# Patient Record
Sex: MALE | Race: WHITE | NOT HISPANIC OR LATINO | Employment: OTHER | URBAN - METROPOLITAN AREA
[De-identification: names, ages, dates, MRNs, and addresses within clinical notes are randomized per-mention and may not be internally consistent; named-entity substitution may affect disease eponyms.]

---

## 2017-11-27 ENCOUNTER — OFFICE VISIT (OUTPATIENT)
Dept: FAMILY MEDICINE | Facility: CLINIC | Age: 47
End: 2017-11-27
Payer: COMMERCIAL

## 2017-11-27 VITALS
TEMPERATURE: 98.9 F | WEIGHT: 295.3 LBS | HEART RATE: 74 BPM | RESPIRATION RATE: 16 BRPM | BODY MASS INDEX: 41.34 KG/M2 | DIASTOLIC BLOOD PRESSURE: 86 MMHG | SYSTOLIC BLOOD PRESSURE: 126 MMHG | OXYGEN SATURATION: 96 % | HEIGHT: 71 IN

## 2017-11-27 DIAGNOSIS — E66.01 MORBID OBESITY WITH BMI OF 45.0-49.9, ADULT (H): ICD-10-CM

## 2017-11-27 DIAGNOSIS — Z13.6 CARDIOVASCULAR SCREENING; LDL GOAL LESS THAN 130: ICD-10-CM

## 2017-11-27 DIAGNOSIS — Z00.00 ROUTINE GENERAL MEDICAL EXAMINATION AT A HEALTH CARE FACILITY: Primary | ICD-10-CM

## 2017-11-27 DIAGNOSIS — R73.09 ELEVATED GLUCOSE: ICD-10-CM

## 2017-11-27 LAB
CHOLEST SERPL-MCNC: 201 MG/DL
GLUCOSE SERPL-MCNC: 182 MG/DL (ref 70–99)
HBA1C MFR BLD: 6.3 % (ref 4.3–6)
HDLC SERPL-MCNC: 32 MG/DL
LDLC SERPL CALC-MCNC: 121 MG/DL
NONHDLC SERPL-MCNC: 169 MG/DL
PSA SERPL-ACNC: 0.86 UG/L (ref 0–4)
TRIGL SERPL-MCNC: 242 MG/DL

## 2017-11-27 PROCEDURE — 99386 PREV VISIT NEW AGE 40-64: CPT | Performed by: FAMILY MEDICINE

## 2017-11-27 PROCEDURE — 82947 ASSAY GLUCOSE BLOOD QUANT: CPT | Performed by: FAMILY MEDICINE

## 2017-11-27 PROCEDURE — G0103 PSA SCREENING: HCPCS | Performed by: FAMILY MEDICINE

## 2017-11-27 PROCEDURE — 36415 COLL VENOUS BLD VENIPUNCTURE: CPT | Performed by: FAMILY MEDICINE

## 2017-11-27 PROCEDURE — 80061 LIPID PANEL: CPT | Performed by: FAMILY MEDICINE

## 2017-11-27 PROCEDURE — 83036 HEMOGLOBIN GLYCOSYLATED A1C: CPT | Performed by: FAMILY MEDICINE

## 2017-11-27 RX ORDER — ALBUTEROL SULFATE 90 UG/1
2 AEROSOL, METERED RESPIRATORY (INHALATION) PRN
COMMUNITY
End: 2019-12-03

## 2017-11-27 ASSESSMENT — PAIN SCALES - GENERAL: PAINLEVEL: NO PAIN (0)

## 2017-11-27 NOTE — MR AVS SNAPSHOT
After Visit Summary   11/27/2017    Abraham Thibodeaux    MRN: [id number]           Patient Information     Date Of Birth          1970        Visit Information        Provider Department      11/27/2017 10:00 AM Dyan Alston MD [facility]        Today's Diagnoses     Routine general medical examination at a health care facility    -  1    Morbid obesity with BMI of 45.0-49.9, adult (H)        CARDIOVASCULAR SCREENING; LDL GOAL LESS THAN 130        Elevated glucose          Care Instructions      Preventive Health Recommendations  Male Ages 40 to 49    Yearly exam:             See your health care provider every year in order to  o   Review health changes.   o   Discuss preventive care.    o   Review your medicines if your doctor has prescribed any.    You should be tested each year for STDs (sexually transmitted diseases) if you re at risk.     Have a cholesterol test every 5 years.     Have a colonoscopy (test for colon cancer) if someone in your family has had colon cancer or polyps before age 50.     After age 45, have a diabetes test (fasting glucose). If you are at risk for diabetes, you should have this test every 3 years.      Talk with your health care provider about whether or not a prostate cancer screening test (PSA) is right for you.    Shots: Get a flu shot each year. Get a tetanus shot every 10 years.     Nutrition:    Eat at least 5 servings of fruits and vegetables daily.     Eat whole-grain bread, whole-wheat pasta and brown rice instead of white grains and rice.     Talk to your provider about Calcium and Vitamin D.     Lifestyle    Exercise for at least 150 minutes a week (30 minutes a day, 5 days a week). This will help you control your weight and prevent disease.     Limit alcohol to one drink per day.     No smoking.     Wear sunscreen to prevent skin cancer.     See your dentist every six months for an exam and cleaning.              Follow-ups  "after your visit        Follow-up notes from your care team     Return in about 1 year (around 2018).      Who to contact     If you have questions or need follow up information about today's clinic visit or your schedule please contact [facility] directly at [phone].  Normal or non-critical lab and imaging results will be communicated to you by MyChart, letter or phone within 4 business days after the clinic has received the results. If you do not hear from us within 7 days, please contact the clinic through MyChart or phone. If you have a critical or abnormal lab result, we will notify you by phone as soon as possible.  Submit refill requests through [employer] or call your pharmacy and they will forward the refill request to us. Please allow 3 business days for your refill to be completed.          Additional Information About Your Visit        MyChart Information     [employer] lets you send messages to your doctor, view your test results, renew your prescriptions, schedule appointments and more. To sign up, go to [email or web address] . Click on \"Log in\" on the left side of the screen, which will take you to the Welcome page. Then click on \"Sign up Now\" on the right side of the page.     You will be asked to enter the access code listed below, as well as some personal information. Please follow the directions to create your username and password.     Your access code is: [email or web address]  Expires: 2018 10:39 AM     Your access code will  in 90 days. If you need help or a new code, please call your [facility] or [phone].        Care EveryWhere ID     This is your Care EveryWhere ID. This could be used by other organizations to access your [facility] medical records  [id number]        Your Vitals Were     Pulse Temperature Respirations Height Pulse Oximetry BMI (Body Mass Index)    74 98.9  F (37.2  C) (Oral) 16 1.791 m (5' 10.5\") 96% 41.77 kg/m2       Blood Pressure from " Last 3 Encounters:   11/27/17 126/86   11/02/12 122/81    Weight from Last 3 Encounters:   11/27/17 133.9 kg (295 lb 4.8 oz)   11/02/12 117.7 kg (259 lb 8 oz)              We Performed the Following     GLUCOSE     Hemoglobin A1c     Lipid panel reflex to direct LDL Fasting     Prostate spec antigen screen        Primary Care Provider Office Phone # Fax #    Dyan Tristan Alston [phone] [address] [facility] [address]        Equal Access to Services     [facility]: Hadii aad ku hadasho Soomaali, waaxda luqadaha, qaybta kaalmada adeegyada, waxay idiin hayaan mor trent . So [facility] [phone].    ATENCIÓN: Si habla español, tiene a roellana disposición servicios gratuitos de asistencia lingüística. [facility] [phone].    We comply with applicable federal civil rights laws and [place] laws. We do not discriminate on the basis of race, color, national origin, age, disability, sex, sexual orientation, or gender identity.            Thank you!     Thank you for choosing [facility]  for your care. Our goal is always to provide you with excellent care. Hearing back from our patients is one way we can continue to improve our services. Please take a few minutes to complete the written survey that you may receive in the mail after your visit with us. Thank you!             Your Updated Medication List - Protect others around you: Learn how to safely use, store and throw away your medicines at www.disposemymeds.org.          This list is accurate as of: 11/27/17 10:39 AM.  Always use your most recent med list.                   Brand Name Dispense Instructions for use Diagnosis    albuterol 108 (90 BASE) MCG/ACT Inhaler    PROAIR HFA/PROVENTIL HFA/VENTOLIN HFA     Inhale 2 puffs into the lungs as needed for shortness of breath / dyspnea or wheezing        FLONASE 50 MCG/ACT spray   Generic drug:  fluticasone      Spray 2 sprays into both nostrils daily.         SYMBICORT 160-4.5 MCG/ACT Inhaler   Generic drug:  budesonide-formoterol      Inhale 2 puffs into the lungs 2 times daily.

## 2017-11-27 NOTE — NURSING NOTE
"Chief Complaint   Patient presents with     Physical     pt is fasting     ER F/U     [facility] -        Initial /86 (BP Location: Right arm, Patient Position: Right side, Cuff Size: Adult Regular)  Pulse 74  Temp 98.9  F (37.2  C) (Oral)  Resp 16  Ht 1.791 m (5' 10.5\")  Wt 133.9 kg (295 lb 4.8 oz)  SpO2 96%  BMI 41.77 kg/m2 Estimated body mass index is 41.77 kg/(m^2) as calculated from the following:    Height as of this encounter: 1.791 m (5' 10.5\").    Weight as of this encounter: 133.9 kg (295 lb 4.8 oz).  Medication Reconciliation: complete     Will Arturo OVIEDO      "

## 2017-11-27 NOTE — PROGRESS NOTES
SUBJECTIVE:   CC: Abraham Thibodeaux is an 47 year old male who presents for preventative health visit.     Healthy Habits:    Do you get at least three servings of calcium containing foods daily (dairy, green leafy vegetables, etc.)? yes    Amount of exercise or daily activities, outside of work: none    Problems taking medications regularly No    Medication side effects: No    Have you had an eye exam in the past two years? yes    Do you see a dentist twice per year? yes  Do you have sleep apnea, excessive snoring or daytime drowsiness?yes - uses CPAP    1. ER follow up - 11/2/17 - chest pain   - [facility]    - stress, anxiety related?      Today's PHQ-2 Score:   PHQ-2 ( 1999 Pfizer) 11/27/2017   Q1: Little interest or pleasure in doing things 0   Q2: Feeling down, depressed or hopeless 0   PHQ-2 Score 0         Abuse: Current or Past(Physical, Sexual or Emotional)- No  Do you feel safe in your environment - Yes  Social History   Substance Use Topics     Smoking status: Never Smoker     Smokeless tobacco: Never Used     Alcohol use No     The patient does not drink >3 drinks per day nor >7 drinks per week.    Last PSA: No results found for: PSA    Reviewed orders with patient. Reviewed health maintenance and updated orders accordingly - Yes  Labs reviewed in EPIC  BP Readings from Last 3 Encounters:   11/27/17 126/86   11/02/12 122/81    Wt Readings from Last 3 Encounters:   11/27/17 133.9 kg (295 lb 4.8 oz)   11/02/12 117.7 kg (259 lb 8 oz)                      Reviewed and updated as needed this visit by clinical staffTobacco  Allergies  Meds  Med Hx  Surg Hx  Fam Hx  Soc Hx        Reviewed and updated as needed this visit by Provider        Here today for routine checkup and to establish care. In general the patient is in pretty good health overall. Was recently seen through [place] emergency department after bout with chest pain - full records reviewed with patient and her care everywhere.  "Had a positive d-dimer at urgent care and was sent to the ER with CT scan was negative. He had a nuclear medicine stress test performed and this was normal. Patient is gained 50 pounds in last couple years since having a child and wife having cancer. Knows he needs to improve his overall health. Review of outside records shows a low HDL but other numbers good and his cholesterol profile. His had some mildly elevated glucose but less than 120. Biggest family risk is cancer      ROS:  C: NEGATIVE for fever, chills, change in weight  I: NEGATIVE for worrisome rashes, moles or lesions  E: NEGATIVE for vision changes or irritation  ENT: NEGATIVE for ear, mouth and throat problems  R: NEGATIVE for significant cough or SOB  CV: NEGATIVE for chest pain, palpitations or peripheral edema  GI: NEGATIVE for nausea, abdominal pain, heartburn, or change in bowel habits   male: negative for dysuria, hematuria, decreased urinary stream, erectile dysfunction, urethral discharge  M: NEGATIVE for significant arthralgias or myalgia  N: NEGATIVE for weakness, dizziness or paresthesias  P: NEGATIVE for changes in mood or affect    OBJECTIVE:   /86 (BP Location: Right arm, Patient Position: Right side, Cuff Size: Adult Regular)  Pulse 74  Temp 98.9  F (37.2  C) (Oral)  Resp 16  Ht 1.791 m (5' 10.5\")  Wt 133.9 kg (295 lb 4.8 oz)  SpO2 96%  BMI 41.77 kg/m2  EXAM:  GENERAL: healthy, alert and no distress  EYES: Eyes grossly normal to inspection, PERRL and conjunctivae and sclerae normal  HENT: ear canals and TM's normal, nose and mouth without ulcers or lesions  NECK: no adenopathy, no asymmetry, masses, or scars and thyroid normal to palpation  RESP: lungs clear to auscultation - no rales, rhonchi or wheezes  CV: regular rate and rhythm, normal S1 S2, no S3 or S4, no murmur, click or rub, no peripheral edema and peripheral pulses strong  ABDOMEN: soft, nontender, no hepatosplenomegaly, no masses and bowel sounds normal  MS: " "no gross musculoskeletal defects noted, no edema  SKIN: no suspicious lesions or rashes  NEURO: Normal strength and tone, mentation intact and speech normal  PSYCH: mentation appears normal, affect normal/bright    ASSESSMENT/PLAN:   1. Routine general medical examination at a health care facility  Discussed routine health maintenance at his current age and going forward. At age 48 will start a more detailed look at colon Screening.    Think a baseline for prostate as well as overall vascular risks. Long discussion regarding diet and exercise for weight loss  - Lipid panel reflex to direct LDL Fasting  - GLUCOSE  - Hemoglobin A1c  - Prostate spec antigen screen    2. Morbid obesity with BMI of 45.0-49.9, adult (H)  As above     3. CARDIOVASCULAR SCREENING; LDL GOAL LESS THAN 130      4. Elevated glucose    - Hemoglobin A1c    COUNSELING:  Reviewed preventive health counseling, as reflected in patient instructions       Regular exercise       Healthy diet/nutrition       Vision screening       Colon cancer screening       Prostate cancer screening           reports that he has never smoked. He has never used smokeless tobacco.      Estimated body mass index is 41.77 kg/(m^2) as calculated from the following:    Height as of this encounter: 1.791 m (5' 10.5\").    Weight as of this encounter: 133.9 kg (295 lb 4.8 oz).   Weight management plan: Discussed healthy diet and exercise guidelines and patient will follow up in 12 months in clinic to re-evaluate.    Counseling Resources:  ATP IV Guidelines  Pooled Cohorts Equation Calculator  FRAX Risk Assessment  ICSI Preventive Guidelines  Dietary Guidelines for Americans, 2010  USDA's MyPlate  ASA Prophylaxis  Lung CA Screening    Dyan Alston MD  [facility]  "

## 2017-11-27 NOTE — LETTER
[address]  [address]  [phone]    December 5, 2017      Abraham Thibodeaux  [address] [place] [address]      Dear Abraham,    Your cholesterol and sugar are mildly elevated (borderline diabetes) - not yet to the point of needing any medication, but I do think we should try to bring them down through diet, exercise, weight loss, etc.  I suggest a diet high in protein and plants (fruits, veggies) and low in simple carbohydrates, combined with an exercise program including both cardio and weight training.  We can recheck on this annually.    MOE Alston MD

## 2017-12-05 NOTE — PROGRESS NOTES
Please mail results and note to patient:    Abraham,  Your cholesterol and sugar are mildly elevated (borderline diabetes) - not yet to the point of needing any medication, but I do think we should try to bring them down through diet, exercise, weight loss, etc.  I suggest a diet high in protein and plants (fruits, veggies) and low in simple carbohydrates, combined with an exercise program including both cardio and weight training.  We can recheck on this annually.    MOE Alston MD

## 2017-12-13 DIAGNOSIS — B35.3 TINEA PEDIS OF BOTH FEET: ICD-10-CM

## 2017-12-13 DIAGNOSIS — B37.2 CANDIDAL INTERTRIGO: ICD-10-CM

## 2017-12-13 NOTE — TELEPHONE ENCOUNTER
Refill request received for ketoconazole 2% cream.  Forwarding to MD for courtesy refill.  No follow up appt scheduled.  Per 12/2016 appt with Dr. Vogel:  Impression/Plan:  1.                   Candida Intertrigo in abdominal skin fold: continue ketoconazole 2% shampoo and ketoconazole 2% cream prn. Use of an OTC drying powder can be helpful.  2.                   Tinea Pedis and Onychomycosis: Pt also has a lot of powdery thick scale on his feet.    Ketoconazole cream 2% BID and Ketoconazole 2% shampoo as body wash when showering.    Urea 40% Cream, apply after fungal cream    Not treating onychomycosis currently. Can continue using OTC nail treatment if it doesn't cause any rashes on the skin around the nail.     Follow-up in 6 months for skin check    Susanne Matos RN

## 2017-12-14 RX ORDER — KETOCONAZOLE 20 MG/G
CREAM TOPICAL
Qty: 60 G | Refills: 1 | Status: SHIPPED | OUTPATIENT
Start: 2017-12-14 | End: 2018-01-26

## 2018-01-15 ENCOUNTER — OFFICE VISIT (OUTPATIENT)
Dept: FAMILY MEDICINE | Facility: CLINIC | Age: 48
End: 2018-01-15
Payer: COMMERCIAL

## 2018-01-15 VITALS
SYSTOLIC BLOOD PRESSURE: 116 MMHG | DIASTOLIC BLOOD PRESSURE: 76 MMHG | OXYGEN SATURATION: 95 % | WEIGHT: 291 LBS | TEMPERATURE: 98.4 F | BODY MASS INDEX: 41.66 KG/M2 | HEART RATE: 82 BPM | RESPIRATION RATE: 18 BRPM | HEIGHT: 70 IN

## 2018-01-15 DIAGNOSIS — J20.9 ACUTE BRONCHITIS, UNSPECIFIED ORGANISM: Primary | ICD-10-CM

## 2018-01-15 DIAGNOSIS — J45.30 MILD PERSISTENT ASTHMA WITHOUT COMPLICATION: ICD-10-CM

## 2018-01-15 PROCEDURE — 99213 OFFICE O/P EST LOW 20 MIN: CPT | Performed by: FAMILY MEDICINE

## 2018-01-15 RX ORDER — AZITHROMYCIN 250 MG/1
TABLET, FILM COATED ORAL
Qty: 6 TABLET | Refills: 0 | Status: SHIPPED | OUTPATIENT
Start: 2018-01-15 | End: 2018-01-18

## 2018-01-15 NOTE — MR AVS SNAPSHOT
"              After Visit Summary   1/15/2018    Abraham Thibodeaux    MRN: [id number]           Patient Information     Date Of Birth          1970        Visit Information        Provider Department      1/15/2018 1:20 PM Dyan Alston MD [facility]        Today's Diagnoses     Acute bronchitis, unspecified organism    -  1    Mild persistent asthma without complication           Follow-ups after your visit        Who to contact     If you have questions or need follow up information about today's clinic visit or your schedule please contact [facility] directly at [phone].  Normal or non-critical lab and imaging results will be communicated to you by [employer], letter or phone within 4 business days after the clinic has received the results. If you do not hear from us within 7 days, please contact the clinic through [employer] or phone. If you have a critical or abnormal lab result, we will notify you by phone as soon as possible.  Submit refill requests through [employer] or call your pharmacy and they will forward the refill request to us. Please allow 3 business days for your refill to be completed.          Additional Information About Your Visit        MyChart Information     [employer] lets you send messages to your doctor, view your test results, renew your prescriptions, schedule appointments and more. To sign up, go to [email or web address] . Click on \"Log in\" on the left side of the screen, which will take you to the Welcome page. Then click on \"Sign up Now\" on the right side of the page.     You will be asked to enter the access code listed below, as well as some personal information. Please follow the directions to create your username and password.     Your access code is: [email or web address]  Expires: 2018 10:39 AM     Your access code will  in 90 days. If you need help or a new code, please call your [facility] or [phone].        Care EveryWhere " "ID     This is your Care EveryWhere ID. This could be used by other organizations to access your [facility] medical records  [id number]        Your Vitals Were     Pulse Temperature Respirations Height Pulse Oximetry BMI (Body Mass Index)    82 98.4  F (36.9  C) (Oral) 18 1.79 m (5' 10.47\") 95% 41.2 kg/m2       Blood Pressure from Last 3 Encounters:   01/15/18 116/76   11/27/17 126/86   11/02/12 122/81    Weight from Last 3 Encounters:   01/15/18 132 kg (291 lb)   11/27/17 133.9 kg (295 lb 4.8 oz)   11/02/12 117.7 kg (259 lb 8 oz)              Today, you had the following     No orders found for display         Today's Medication Changes          These changes are accurate as of: 1/15/18  1:50 PM.  If you have any questions, ask your nurse or doctor.               Start taking these medicines.        Dose/Directions    azithromycin 250 MG tablet   Commonly known as:  ZITHROMAX   Used for:  Acute bronchitis, unspecified organism   Started by:  Dyan Alston MD        Two tabs today.  One tab daily x 4 days.   Quantity:  6 tablet   Refills:  0            Where to get your medicines      These medications were sent to [place] [address] [place] [facility] [address] [facility]  [address] [facility] [address]     Phone:  [phone]     azithromycin 250 MG tablet                Primary Care Provider Office Phone # Fax #    Dyan Alston [phone] [address] [facility] [address]        Equal Access to Services     [facility]: Hadii pedro cuellar hadasho Sochelle, waaxda luqadaha, qaybta kaalmada cassandra, bryan trent . So [facility] [phone].    ATENCIÓN: Si habla español, tiene a orellana disposición servicios gratuitos de asistencia lingüística. Llame al [phone].    We comply with applicable federal civil rights laws and [place] laws. We do not discriminate on the basis of race, color, national origin, age, disability, sex, " sexual orientation, or gender identity.            Thank you!     Thank you for choosing [facility]  for your care. Our goal is always to provide you with excellent care. Hearing back from our patients is one way we can continue to improve our services. Please take a few minutes to complete the written survey that you may receive in the mail after your visit with us. Thank you!             Your Updated Medication List - Protect others around you: Learn how to safely use, store and throw away your medicines at www.disposemymeds.org.          This list is accurate as of: 1/15/18  1:50 PM.  Always use your most recent med list.                   Brand Name Dispense Instructions for use Diagnosis    albuterol 108 (90 BASE) MCG/ACT Inhaler    PROAIR HFA/PROVENTIL HFA/VENTOLIN HFA     Inhale 2 puffs into the lungs as needed for shortness of breath / dyspnea or wheezing        azithromycin 250 MG tablet    ZITHROMAX    6 tablet    Two tabs today.  One tab daily x 4 days.    Acute bronchitis, unspecified organism       FLONASE 50 MCG/ACT spray   Generic drug:  fluticasone      Spray 2 sprays into both nostrils daily.        ketoconazole 2 % cream    NIZORAL    60 g    Apply twice a day to rash on the belly fold, groin, and feet. Wash hands after applying to each location. Due for appt for further refills    Candidal intertrigo, Tinea pedis of both feet       SYMBICORT 160-4.5 MCG/ACT Inhaler   Generic drug:  budesonide-formoterol      Inhale 2 puffs into the lungs 2 times daily.

## 2018-01-15 NOTE — PROGRESS NOTES
"  SUBJECTIVE:   Abraham Thibodeaux is a 47 year old male who presents to clinic today for the following health issues:      Acute Illness   Acute illness concerns: Cough/Chest congestion   Onset: 9 days ago     Fever: no    Chills/Sweats: YES    Headache (location?): YES, from coughing     Sinus Pressure:YES    Conjunctivitis:  no    Ear Pain: no    Rhinorrhea: YES    Congestion: YES    Sore Throat: no      Cough: YES-non-productive, productive of clear sputum, productive of yellow sputum    Wheeze: YES    Decreased Appetite: no    Nausea: no    Vomiting: no    Diarrhea:  YES    Dysuria/Freq.: no    Fatigue/Achiness: no    Sick/Strep Exposure: no     Therapies Tried and outcome: OTC - mucinex, does not help completely.     SUBJECTIVE:  Here today with the above symptoms.  Daughter had a cold a couple weeks ago when he seems to have caught that from her.  But it settled into his chest.  No significant fevers or chills, only a few sweats at night.  He has a deep cough productive of some yellowish sputum.  Says he gets this every few years and usually responds to a Z-Teo.  Has a history of asthma and has been keeping up with his inhaler.  Does not feel as though he is having significant wheezing or shortness of breath    Review of systems otherwise negative.  Past medical, family, and social history reviewed and updated in chart.    OBJECTIVE:  /76 (BP Location: Right arm, Patient Position: Sitting, Cuff Size: Adult Large)  Pulse 82  Temp 98.4  F (36.9  C) (Oral)  Resp 18  Ht 1.79 m (5' 10.47\")  Wt 132 kg (291 lb)  SpO2 95%  BMI 41.2 kg/m2  Alert, pleasant, upbeat, and in no apparent discomfort.  Ears normal. Throat and pharynx normal. Neck supple. No adenopathy or masses in the neck or supraclavicular regions. Sinuses non tender.   Lungs -bronchial breath sounds bilaterally but good air movement overall  Heart -regular rate and rhythm without murmur  Past labs reviewed with the patient.     ASSESSMENT / " PLAN:  (J20.9) Acute bronchitis, unspecified organism  (primary encounter diagnosis)  Comment: Discussed mechanism of action of the proposed medication, as well as potential effects, both good and bad.  Patient expressed understanding and agreed with treatment.   Plan:     (J45.30) Mild persistent asthma without complication  Comment: Advised to keep up with inhalers and increase his albuterol as needed  Plan:     Follow up as needed   S. Tristan Alston MD    (Chart documentation completed in part with Dragon voice-recognition software.  Even though reviewed some grammatical, spelling, and word errors may remain.)

## 2018-01-15 NOTE — NURSING NOTE
"Chief Complaint   Patient presents with     Chest congestion     Cough       Initial Ht 1.79 m (5' 10.47\")  Wt 132 kg (291 lb)  BMI 41.2 kg/m2 Estimated body mass index is 41.2 kg/(m^2) as calculated from the following:    Height as of this encounter: 1.79 m (5' 10.47\").    Weight as of this encounter: 132 kg (291 lb).  Medication Reconciliation: complete     Eliel Low, Medical Assistant      "

## 2018-01-18 ENCOUNTER — TELEPHONE (OUTPATIENT)
Dept: FAMILY MEDICINE | Facility: CLINIC | Age: 48
End: 2018-01-18

## 2018-01-18 DIAGNOSIS — J20.9 ACUTE BRONCHITIS, UNSPECIFIED ORGANISM: ICD-10-CM

## 2018-01-18 RX ORDER — PREDNISONE 20 MG/1
60 TABLET ORAL DAILY
Qty: 15 TABLET | Refills: 0 | Status: SHIPPED | OUTPATIENT
Start: 2018-01-18 | End: 2018-01-26

## 2018-01-18 NOTE — TELEPHONE ENCOUNTER
..Reason for Call:  Other     Detailed comments: Patient said Dr. Alston prescribed him the ZPACK and he is not sure it is working, Gamaliel told the patient to update him on the status of the medication.    Phone Number Patient can be reached at: Home number on file [phone] (home)    Best Time: anytime    Can we leave a detailed message on this number? YES    Call taken on 1/18/2018 at 8:32 AM by Jagdish Farrar

## 2018-01-18 NOTE — TELEPHONE ENCOUNTER
"Patient calling with update; he has one more day of the Z-pack left after today and he is still wheezing and coughing. The mucus is more clear now so he feels he is heading in the right direction, but the cough and wheezing had only gotten a little bit better. He is wondering if he needs something else to \"knock this out\". Please advise.     Guru Badillo RN, BSN    "

## 2018-01-19 NOTE — TELEPHONE ENCOUNTER
I think a short course of prednisone should help.  This is probably a viral infection that'll take time.  Any bronchitis part is handled by the zpack - that will keep working.

## 2018-01-19 NOTE — TELEPHONE ENCOUNTER
Called and informed the patient of the information below as written by the provider.    Karis Lawson RN, [facility]

## 2018-01-26 ENCOUNTER — TELEPHONE (OUTPATIENT)
Dept: FAMILY MEDICINE | Facility: CLINIC | Age: 48
End: 2018-01-26

## 2018-01-26 ENCOUNTER — OFFICE VISIT (OUTPATIENT)
Dept: FAMILY MEDICINE | Facility: CLINIC | Age: 48
End: 2018-01-26
Payer: COMMERCIAL

## 2018-01-26 VITALS
SYSTOLIC BLOOD PRESSURE: 126 MMHG | DIASTOLIC BLOOD PRESSURE: 70 MMHG | BODY MASS INDEX: 42.15 KG/M2 | WEIGHT: 297.7 LBS | TEMPERATURE: 98.3 F | HEART RATE: 84 BPM | RESPIRATION RATE: 20 BRPM

## 2018-01-26 DIAGNOSIS — J20.9 ACUTE BRONCHITIS, UNSPECIFIED ORGANISM: ICD-10-CM

## 2018-01-26 PROCEDURE — 99213 OFFICE O/P EST LOW 20 MIN: CPT | Performed by: FAMILY MEDICINE

## 2018-01-26 NOTE — MR AVS SNAPSHOT
"              After Visit Summary   2018    Abraham Thibodeaux    MRN: [id number]           Patient Information     Date Of Birth          1970        Visit Information        Provider Department      2018 3:20 PM Dyan Alston MD [facility]        Today's Diagnoses     Acute bronchitis, unspecified organism           Follow-ups after your visit        Follow-up notes from your care team     Return if symptoms worsen or fail to improve.      Who to contact     If you have questions or need follow up information about today's clinic visit or your schedule please contact [facility] directly at [phone].  Normal or non-critical lab and imaging results will be communicated to you by MyChart, letter or phone within 4 business days after the clinic has received the results. If you do not hear from us within 7 days, please contact the clinic through MyChart or phone. If you have a critical or abnormal lab result, we will notify you by phone as soon as possible.  Submit refill requests through [employer] or call your pharmacy and they will forward the refill request to us. Please allow 3 business days for your refill to be completed.          Additional Information About Your Visit        MyChart Information     [employer] lets you send messages to your doctor, view your test results, renew your prescriptions, schedule appointments and more. To sign up, go to [email or web address] . Click on \"Log in\" on the left side of the screen, which will take you to the Welcome page. Then click on \"Sign up Now\" on the right side of the page.     You will be asked to enter the access code listed below, as well as some personal information. Please follow the directions to create your username and password.     Your access code is: [email or web address]  Expires: 2018 10:39 AM     Your access code will  in 90 days. If you need help or a new code, please call your [facility] or " [phone].        Care EveryWhere ID     This is your Care EveryWhere ID. This could be used by other organizations to access your [facility] medical records  [id number]        Your Vitals Were     Pulse Temperature Respirations BMI (Body Mass Index)          84 98.3  F (36.8  C) (Oral) 20 42.15 kg/m2         Blood Pressure from Last 3 Encounters:   01/26/18 126/70   01/15/18 116/76   11/27/17 126/86    Weight from Last 3 Encounters:   01/26/18 135 kg (297 lb 11.2 oz)   01/15/18 132 kg (291 lb)   11/27/17 133.9 kg (295 lb 4.8 oz)              Today, you had the following     No orders found for display         Today's Medication Changes          These changes are accurate as of 1/26/18  3:44 PM.  If you have any questions, ask your nurse or doctor.               Start taking these medicines.        Dose/Directions    amoxicillin-clavulanate 875-125 MG per tablet   Commonly known as:  AUGMENTIN   Used for:  Acute bronchitis, unspecified organism   Started by:  Dyan Alston MD        Dose:  1 tablet   Take 1 tablet by mouth 2 times daily   Quantity:  20 tablet   Refills:  0            Where to get your medicines      These medications were sent to [place] [address] [place] [facility] [address] [facility]  [address] [facility] [address]     Phone:  [phone]     amoxicillin-clavulanate 875-125 MG per tablet                Primary Care Provider Office Phone # Fax #    Dyan Alston [phone] [address] [facility] [address]        Equal Access to Services     [facility]: Hadii pedro klein Sochelle, waaxda luqadaha, qaybta kaalmada cassandra, bryan harley. So [facility] [phone].    ATENCIÓN: Si habla español, tiene a orellana disposición servicios gratuitos de asistencia lingüística. Llame al [phone].    We comply with applicable federal civil rights laws and [place] laws. We do not discriminate on the basis of  race, color, national origin, age, disability, sex, sexual orientation, or gender identity.            Thank you!     Thank you for choosing [facility]  for your care. Our goal is always to provide you with excellent care. Hearing back from our patients is one way we can continue to improve our services. Please take a few minutes to complete the written survey that you may receive in the mail after your visit with us. Thank you!             Your Updated Medication List - Protect others around you: Learn how to safely use, store and throw away your medicines at www.disposemymeds.org.          This list is accurate as of 1/26/18  3:44 PM.  Always use your most recent med list.                   Brand Name Dispense Instructions for use Diagnosis    albuterol 108 (90 BASE) MCG/ACT Inhaler    PROAIR HFA/PROVENTIL HFA/VENTOLIN HFA     Inhale 2 puffs into the lungs as needed for shortness of breath / dyspnea or wheezing        amoxicillin-clavulanate 875-125 MG per tablet    AUGMENTIN    20 tablet    Take 1 tablet by mouth 2 times daily    Acute bronchitis, unspecified organism       FLONASE 50 MCG/ACT spray   Generic drug:  fluticasone      Spray 2 sprays into both nostrils daily.        SYMBICORT 160-4.5 MCG/ACT Inhaler   Generic drug:  budesonide-formoterol      Inhale 2 puffs into the lungs 2 times daily.

## 2018-01-26 NOTE — NURSING NOTE
"Chief Complaint   Patient presents with     RECHECK       Initial /70 (BP Location: Right arm, Patient Position: Sitting, Cuff Size: Adult Large)  Pulse 84  Temp 98.3  F (36.8  C) (Oral)  Resp 20  Wt 135 kg (297 lb 11.2 oz)  BMI 42.15 kg/m2 Estimated body mass index is 42.15 kg/(m^2) as calculated from the following:    Height as of 1/15/18: 1.79 m (5' 10.47\").    Weight as of this encounter: 135 kg (297 lb 11.2 oz).  Medication Reconciliation: tierra Rod        "

## 2018-01-26 NOTE — TELEPHONE ENCOUNTER
Called patient and after taking Z-nehemiah and prednisone is still coughing up brown. Advised to be reassessed. Scheduled today for recheck.    Karis Lawson RN, [facility] Triage

## 2018-01-26 NOTE — PROGRESS NOTES
SUBJECTIVE:   Abraham Thibodeaux is a 47 year old male who presents to clinic today for the following health issues:      Follow up cough- 4 weeks now    Antibiotic and steroid has not helped.  -Coughing up brown phlegm    SUBJECTIVE:  Here today in follow-up of upper respiratory infection.  I saw him last week and treated presumptive bronchitis with Zithromax.  He said this did help a little but as soon as he stopped it the phlegm came back and now is brownish.  No fevers or chills.  Not short of breath or wheezing.    Review of systems otherwise negative.  Past medical, family, and social history reviewed and updated in chart.    OBJECTIVE:  /70 (BP Location: Right arm, Patient Position: Sitting, Cuff Size: Adult Large)  Pulse 84  Temp 98.3  F (36.8  C) (Oral)  Resp 20  Wt 135 kg (297 lb 11.2 oz)  BMI 42.15 kg/m2  Alert, pleasant, upbeat, and in no apparent discomfort.  Voice rough  Ears normal.  Throat and pharynx normal.  Neck supple. No adenopathy or masses in the neck or supraclavicular regions. Sinuses non tender.   S1 and S2 normal, no murmurs, clicks, gallops or rubs. Regular rate and rhythm. Chest is clear; no wheezes or rales. No edema or JVD.  Past labs reviewed with the patient.     ASSESSMENT / PLAN:  (J20.9) Acute bronchitis, unspecified organism  Comment: We will change to Augmentin and complete a full course of this  Plan: amoxicillin-clavulanate (AUGMENTIN) 875-125 MG         per tablet        Discussed mechanism of action of the proposed medication, as well as potential effects, both good and bad.  Patient expressed understanding and agreed with treatment.     Follow up as needed   S. Tristan Alston MD    (Chart documentation completed in part with Dragon voice-recognition software.  Even though reviewed some grammatical, spelling, and word errors may remain.)

## 2018-01-26 NOTE — TELEPHONE ENCOUNTER
Reason for Call:  Other prescription    Detailed comments: Pt calling for he was previously prescribed azithromyicn for cold and chest congestion but that was not working and was later prescribed prednisone but Pt is still coughing up green phlegm and is not improving.    Phone Number Patient can be reached at: Home number on file [phone] (home)    Best Time: anytime    Can we leave a detailed message on this number? YES    Call taken on 1/26/2018 at 8:21 AM by Stewart Felix

## 2018-01-27 ASSESSMENT — ASTHMA QUESTIONNAIRES: ACT_TOTALSCORE: 21

## 2018-04-12 ENCOUNTER — OFFICE VISIT (OUTPATIENT)
Dept: PEDIATRICS | Facility: CLINIC | Age: 48
End: 2018-04-12
Payer: COMMERCIAL

## 2018-04-12 VITALS
BODY MASS INDEX: 40.63 KG/M2 | DIASTOLIC BLOOD PRESSURE: 80 MMHG | WEIGHT: 287 LBS | SYSTOLIC BLOOD PRESSURE: 132 MMHG | OXYGEN SATURATION: 95 % | TEMPERATURE: 98.1 F | HEART RATE: 85 BPM

## 2018-04-12 DIAGNOSIS — J20.9 ACUTE BRONCHITIS, UNSPECIFIED ORGANISM: Primary | ICD-10-CM

## 2018-04-12 PROCEDURE — 99213 OFFICE O/P EST LOW 20 MIN: CPT | Performed by: INTERNAL MEDICINE

## 2018-04-12 RX ORDER — GUAIFENESIN 600 MG/1
1200 TABLET, EXTENDED RELEASE ORAL 2 TIMES DAILY
COMMUNITY
End: 2018-08-06

## 2018-04-12 RX ORDER — GUAIFENESIN/DEXTROMETHORPHAN 100-10MG/5
10 SYRUP ORAL 4 TIMES DAILY PRN
Qty: 560 ML | Refills: 0 | Status: SHIPPED | OUTPATIENT
Start: 2018-04-12 | End: 2018-08-06

## 2018-04-12 RX ORDER — DOXYCYCLINE 100 MG/1
100 CAPSULE ORAL 2 TIMES DAILY
Qty: 20 CAPSULE | Refills: 0 | Status: SHIPPED | OUTPATIENT
Start: 2018-04-12 | End: 2018-08-06

## 2018-04-12 NOTE — MR AVS SNAPSHOT
After Visit Summary   4/12/2018    Abraham Thibodeaux    MRN: [id number]           Patient Information     Date Of Birth          1970        Visit Information        Provider Department      4/12/2018 1:10 PM Grace Mahmood MD PhD [facility]        Today's Diagnoses     Acute bronchitis, unspecified organism    -  1      Care Instructions    Medication(s) prescribed today:    Orders Placed This Encounter   Medications     doxycycline (VIBRAMYCIN) 100 MG capsule     Sig: Take 1 capsule (100 mg) by mouth 2 times daily     Dispense:  20 capsule     Refill:  0     guaiFENesin-dextromethorphan (ROBITUSSIN DM) 100-10 MG/5ML syrup     Sig: Take 10 mLs by mouth 4 times daily as needed for cough     Dispense:  560 mL     Refill:  0               Follow-ups after your visit        Who to contact     If you have questions or need follow up information about today's clinic visit or your schedule please contact [facility] directly at [phone].  Normal or non-critical lab and imaging results will be communicated to you by [employer], letter or phone within 4 business days after the clinic has received the results. If you do not hear from us within 7 days, please contact the clinic through [employer] or phone. If you have a critical or abnormal lab result, we will notify you by phone as soon as possible.  Submit refill requests through [employer] or call your pharmacy and they will forward the refill request to us. Please allow 3 business days for your refill to be completed.          Additional Information About Your Visit        MyChart Information     [employer] is an electronic gateway that provides easy, online access to your medical records. With [employer], you can request a clinic appointment, read your test results, renew a prescription or communicate with your care team.     To sign up for [employer] visit the website at [email or web address]   You will be asked to enter the  access code listed below, as well as some personal information. Please follow the directions to create your username and password.     Your access code is: [email or web address]  Expires: 2018  1:40 PM     Your access code will  in 90 days. If you need help or a new code, please contact your [facility] or call [phone] for assistance.        Care EveryWhere ID     This is your Care EveryWhere ID. This could be used by other organizations to access your [facility] medical records  [id number]        Your Vitals Were     Pulse Temperature Pulse Oximetry BMI (Body Mass Index)          85 98.1  F (36.7  C) (Temporal) 95% 40.63 kg/m2         Blood Pressure from Last 3 Encounters:   18 132/80   18 126/70   01/15/18 116/76    Weight from Last 3 Encounters:   18 287 lb (130.2 kg)   18 297 lb 11.2 oz (135 kg)   01/15/18 291 lb (132 kg)              Today, you had the following     No orders found for display         Today's Medication Changes          These changes are accurate as of 18  1:40 PM.  If you have any questions, ask your nurse or doctor.               Start taking these medicines.        Dose/Directions    doxycycline 100 MG capsule   Commonly known as:  VIBRAMYCIN   Used for:  Acute bronchitis, unspecified organism   Started by:  Grace Mahmood MD PhD        Dose:  100 mg   Take 1 capsule (100 mg) by mouth 2 times daily   Quantity:  20 capsule   Refills:  0       guaiFENesin-dextromethorphan 100-10 MG/5ML syrup   Commonly known as:  ROBITUSSIN DM   Used for:  Acute bronchitis, unspecified organism   Started by:  Grace Mahmood MD PhD        Dose:  10 mL   Take 10 mLs by mouth 4 times daily as needed for cough   Quantity:  560 mL   Refills:  0            Where to get your medicines      These medications were sent to [place] [id number] [place] [facility] - [place], [place] - [address] [facility]  [address] [facility] [facility] [address]     Phone:  [phone]      doxycycline 100 MG capsule    guaiFENesin-dextromethorphan 100-10 MG/5ML syrup                Primary Care Provider Office Phone # Fax #    Dyan Tristan Alston [phone] [address] [facility] [address]        Equal Access to Services     KEYANA SMITH : Hadii aad ku hadasho Soomaali, waaxda luqadaha, qaybta kaalmada adeegyada, waxay idiin hayaan adeeg khnehemiah lakain ah. So [facility] [phone].    ATENCIÓN: Si habla español, tiene a orellana disposición servicios gratuitos de asistencia lingüística. Llame al [phone].    We comply with applicable federal civil rights laws and [place] laws. We do not discriminate on the basis of race, color, national origin, age, disability, sex, sexual orientation, or gender identity.            Thank you!     Thank you for choosing [facility]  for your care. Our goal is always to provide you with excellent care. Hearing back from our patients is one way we can continue to improve our services. Please take a few minutes to complete the written survey that you may receive in the mail after your visit with us. Thank you!             Your Updated Medication List - Protect others around you: Learn how to safely use, store and throw away your medicines at www.disposemymeds.org.          This list is accurate as of 4/12/18  1:40 PM.  Always use your most recent med list.                   Brand Name Dispense Instructions for use Diagnosis    albuterol 108 (90 Base) MCG/ACT Inhaler    PROAIR HFA/PROVENTIL HFA/VENTOLIN HFA     Inhale 2 puffs into the lungs as needed for shortness of breath / dyspnea or wheezing        doxycycline 100 MG capsule    VIBRAMYCIN    20 capsule    Take 1 capsule (100 mg) by mouth 2 times daily    Acute bronchitis, unspecified organism       FLONASE 50 MCG/ACT spray   Generic drug:  fluticasone      Spray 2 sprays into both nostrils daily.        guaiFENesin 600 MG 12 hr tablet    MUCINEX     Take 1,200 mg by  mouth 2 times daily        guaiFENesin-dextromethorphan 100-10 MG/5ML syrup    ROBITUSSIN DM    560 mL    Take 10 mLs by mouth 4 times daily as needed for cough    Acute bronchitis, unspecified organism       SYMBICORT 160-4.5 MCG/ACT Inhaler   Generic drug:  budesonide-formoterol      Inhale 2 puffs into the lungs 2 times daily.

## 2018-04-12 NOTE — NURSING NOTE
"Chief Complaint   Patient presents with     URI       Initial /80  Pulse 85  Temp 98.1  F (36.7  C) (Temporal)  Wt 287 lb (130.2 kg)  SpO2 95%  BMI 40.63 kg/m2 Estimated body mass index is 40.63 kg/(m^2) as calculated from the following:    Height as of 1/15/18: 5' 10.47\" (1.79 m).    Weight as of this encounter: 287 lb (130.2 kg).  Medication Reconciliation: complete    "

## 2018-04-12 NOTE — PROGRESS NOTES
SUBJECTIVE:   Abraham Thibodeaux is a 47 year old male who presents to clinic today for the following health issues:      RESPIRATORY SYMPTOMS      Duration: 4 days    Description  nasal congestion, facial pain/pressure, cough, wheezing, fever, headache, fatigue/malaise and hoarse voice    Severity: severe    Accompanying signs and symptoms: None    History (predisposing factors):  none    Precipitating or alleviating factors: None    Therapies tried and outcome:  oral decongestant guaifenesin    History of mild persistent asthma.  He does not feel the asthma is acting up.  His asthma is primarily triggered by cat allergies.    ROS:  Constitutional, HEENT, cardiovascular, pulmonary, gi and gu systems are negative, except as otherwise noted.         Current Outpatient Prescriptions on File Prior to Visit:  fluticasone (FLONASE) 50 MCG/ACT nasal spray Spray 2 sprays into both nostrils daily.   budesonide-formoterol (SYMBICORT) 160-4.5 MCG/ACT inhaler Inhale 2 puffs into the lungs 2 times daily.   albuterol (PROAIR HFA/PROVENTIL HFA/VENTOLIN HFA) 108 (90 BASE) MCG/ACT Inhaler Inhale 2 puffs into the lungs as needed for shortness of breath / dyspnea or wheezing     No current facility-administered medications on file prior to visit.        Patient Active Problem List   Diagnosis     Morbid obesity with BMI of 45.0-49.9, adult (H)     CARDIOVASCULAR SCREENING; LDL GOAL LESS THAN 130     Mild persistent asthma without complication     History reviewed. No pertinent surgical history.    Social History   Substance Use Topics     Smoking status: Never Smoker     Smokeless tobacco: Never Used     Alcohol use No     History reviewed. No pertinent family history.          Problem list, Medication list, Allergies, and Medical/Social/Surgical histories reviewed in EPIC and updated as appropriate.    OBJECTIVE:                                                    /80  Pulse 85  Temp 98.1  F (36.7  C) (Temporal)  Wt 287 lb  (130.2 kg)  SpO2 95%  BMI 40.63 kg/m2    GENERAL: healthy, alert and no distress  HEENT: Congestion and sinus tenderness  Neck: no adenopathy/mass/stiffness. Thyroid normal.  Lung: coarse breath sound, no wheezing  Heart: RRR, normal S1/2, no murmur/gallop/rup        Diagnostic test results:        ASSESSMENT/PLAN:                                                      47 year old male with the following diagnoses and treatment plan:      ICD-10-CM    1. Acute bronchitis, unspecified organism J20.9 doxycycline (VIBRAMYCIN) 100 MG capsule     guaiFENesin-dextromethorphan (ROBITUSSIN DM) 100-10 MG/5ML syrup       Will call or return to clinic if worsening or symptoms not improving as discussed.  See Patient Instructions.      Grace Mahmood MD-PhD  [facility]    (Note: Chart documentation was done in part with Dragon Voice Recognition software. Although reviewed after completion, some word and grammatical errors may remain.)

## 2018-07-31 NOTE — PROGRESS NOTES
"  SUBJECTIVE:   Abraham Thibodeaux is a 48 year old male who presents to clinic today for the following health issues:  {Provider please address medication reconciliation discrepancies--rooming staff please delete if no med/rec issues}    HPI  {additional problems for roomer to add, delete if none:307137}  Problem list and histories reviewed & adjusted, as indicated.  Additional history: {NONE - AS DOCUMENTED:536498::\"as documented\"}    {ACUTE Problem SUPERLIST - brief histories:986435}    {HIST REVIEW/ LINKS 2:259476}    {PROVIDER CHARTING PREFERENCE:587041}  "

## 2018-08-02 ENCOUNTER — TRANSFERRED RECORDS (OUTPATIENT)
Dept: HEALTH INFORMATION MANAGEMENT | Facility: CLINIC | Age: 48
End: 2018-08-02

## 2018-08-06 ENCOUNTER — OFFICE VISIT (OUTPATIENT)
Dept: FAMILY MEDICINE | Facility: CLINIC | Age: 48
End: 2018-08-06
Payer: COMMERCIAL

## 2018-08-06 VITALS
HEIGHT: 71 IN | DIASTOLIC BLOOD PRESSURE: 74 MMHG | WEIGHT: 291.9 LBS | OXYGEN SATURATION: 95 % | BODY MASS INDEX: 40.86 KG/M2 | SYSTOLIC BLOOD PRESSURE: 114 MMHG | HEART RATE: 79 BPM | RESPIRATION RATE: 18 BRPM | TEMPERATURE: 97.5 F

## 2018-08-06 DIAGNOSIS — J45.30 MILD PERSISTENT ASTHMA WITHOUT COMPLICATION: ICD-10-CM

## 2018-08-06 DIAGNOSIS — G47.33 OSA ON CPAP: ICD-10-CM

## 2018-08-06 DIAGNOSIS — Z00.00 ROUTINE GENERAL MEDICAL EXAMINATION AT A HEALTH CARE FACILITY: Primary | ICD-10-CM

## 2018-08-06 DIAGNOSIS — E66.01 MORBID OBESITY WITH BMI OF 45.0-49.9, ADULT (H): ICD-10-CM

## 2018-08-06 DIAGNOSIS — E78.5 HYPERLIPIDEMIA LDL GOAL <100: ICD-10-CM

## 2018-08-06 DIAGNOSIS — Z11.4 SCREENING FOR HIV (HUMAN IMMUNODEFICIENCY VIRUS): ICD-10-CM

## 2018-08-06 DIAGNOSIS — R73.03 PREDIABETES: ICD-10-CM

## 2018-08-06 PROBLEM — Z13.6 CARDIOVASCULAR SCREENING; LDL GOAL LESS THAN 130: Status: RESOLVED | Noted: 2017-11-27 | Resolved: 2018-08-06

## 2018-08-06 LAB
ALBUMIN SERPL-MCNC: 3.5 G/DL (ref 3.4–5)
ALP SERPL-CCNC: 70 U/L (ref 40–150)
ALT SERPL W P-5'-P-CCNC: 51 U/L (ref 0–70)
ANION GAP SERPL CALCULATED.3IONS-SCNC: 10 MMOL/L (ref 3–14)
AST SERPL W P-5'-P-CCNC: 36 U/L (ref 0–45)
BILIRUB SERPL-MCNC: 0.6 MG/DL (ref 0.2–1.3)
BUN SERPL-MCNC: 10 MG/DL (ref 7–30)
CALCIUM SERPL-MCNC: 8.4 MG/DL (ref 8.5–10.1)
CHLORIDE SERPL-SCNC: 106 MMOL/L (ref 94–109)
CHOLEST SERPL-MCNC: 162 MG/DL
CO2 SERPL-SCNC: 25 MMOL/L (ref 20–32)
CREAT SERPL-MCNC: 0.72 MG/DL (ref 0.66–1.25)
CREAT UR-MCNC: 282 MG/DL
ERYTHROCYTE [DISTWIDTH] IN BLOOD BY AUTOMATED COUNT: 12.9 % (ref 10–15)
GFR SERPL CREATININE-BSD FRML MDRD: >90 ML/MIN/1.7M2
GLUCOSE SERPL-MCNC: 97 MG/DL (ref 70–99)
HBA1C MFR BLD: 5.8 % (ref 0–5.6)
HCT VFR BLD AUTO: 43.8 % (ref 40–53)
HDLC SERPL-MCNC: 33 MG/DL
HGB BLD-MCNC: 14.6 G/DL (ref 13.3–17.7)
LDLC SERPL CALC-MCNC: 95 MG/DL
MCH RBC QN AUTO: 31.6 PG (ref 26.5–33)
MCHC RBC AUTO-ENTMCNC: 33.3 G/DL (ref 31.5–36.5)
MCV RBC AUTO: 95 FL (ref 78–100)
MICROALBUMIN UR-MCNC: 14 MG/L
MICROALBUMIN/CREAT UR: 5.04 MG/G CR (ref 0–17)
NONHDLC SERPL-MCNC: 129 MG/DL
PLATELET # BLD AUTO: 221 10E9/L (ref 150–450)
POTASSIUM SERPL-SCNC: 4.2 MMOL/L (ref 3.4–5.3)
PROT SERPL-MCNC: 7.4 G/DL (ref 6.8–8.8)
RBC # BLD AUTO: 4.62 10E12/L (ref 4.4–5.9)
SODIUM SERPL-SCNC: 141 MMOL/L (ref 133–144)
TRIGL SERPL-MCNC: 172 MG/DL
TSH SERPL DL<=0.005 MIU/L-ACNC: 0.61 MU/L (ref 0.4–4)
WBC # BLD AUTO: 9 10E9/L (ref 4–11)

## 2018-08-06 PROCEDURE — 82043 UR ALBUMIN QUANTITATIVE: CPT | Performed by: FAMILY MEDICINE

## 2018-08-06 PROCEDURE — 80053 COMPREHEN METABOLIC PANEL: CPT | Performed by: FAMILY MEDICINE

## 2018-08-06 PROCEDURE — 99213 OFFICE O/P EST LOW 20 MIN: CPT | Mod: 25 | Performed by: FAMILY MEDICINE

## 2018-08-06 PROCEDURE — 84443 ASSAY THYROID STIM HORMONE: CPT | Performed by: FAMILY MEDICINE

## 2018-08-06 PROCEDURE — 85027 COMPLETE CBC AUTOMATED: CPT | Performed by: FAMILY MEDICINE

## 2018-08-06 PROCEDURE — 80061 LIPID PANEL: CPT | Performed by: FAMILY MEDICINE

## 2018-08-06 PROCEDURE — 99207 ZZC FOR CODING REVIEW: CPT | Mod: 25 | Performed by: FAMILY MEDICINE

## 2018-08-06 PROCEDURE — 36415 COLL VENOUS BLD VENIPUNCTURE: CPT | Performed by: FAMILY MEDICINE

## 2018-08-06 PROCEDURE — 83036 HEMOGLOBIN GLYCOSYLATED A1C: CPT | Performed by: FAMILY MEDICINE

## 2018-08-06 PROCEDURE — 99396 PREV VISIT EST AGE 40-64: CPT | Performed by: FAMILY MEDICINE

## 2018-08-06 PROCEDURE — 87389 HIV-1 AG W/HIV-1&-2 AB AG IA: CPT | Performed by: FAMILY MEDICINE

## 2018-08-06 RX ORDER — CHOLECALCIFEROL (VITAMIN D3) 1250 MCG
50000 CAPSULE ORAL
COMMUNITY
Start: 2018-06-10 | End: 2018-08-27

## 2018-08-06 NOTE — MR AVS SNAPSHOT
After Visit Summary   8/6/2018    Abraham Thibodeaux    MRN: [id number]           Patient Information     Date Of Birth          1970        Visit Information        Provider Department      8/6/2018 8:40 AM Juancarlos Adrian MD [facility]        Today's Diagnoses     Routine general medical examination at a health care facility    -  1    CAMILO on CPAP        Prediabetes        Mild persistent asthma without complication        Morbid obesity with BMI of 45.0-49.9, adult (H)        Hyperlipidemia LDL goal <100        Screening for HIV (human immunodeficiency virus)          Care Instructions      Preventive Health Recommendations  Male Ages 50 - 64    Yearly exam:             See your health care provider every year in order to  o   Review health changes.   o   Discuss preventive care.    o   Review your medicines if your doctor has prescribed any.     Have a cholesterol test every 5 years, or more frequently if you are at risk for high cholesterol/heart disease.     Have a diabetes test (fasting glucose) every three years. If you are at risk for diabetes, you should have this test more often.     Have a colonoscopy at age 50, or have a yearly FIT test (stool test). These exams will check for colon cancer.      Talk with your health care provider about whether or not a prostate cancer screening test (PSA) is right for you.    You should be tested each year for STDs (sexually transmitted diseases), if you re at risk.     Shots: Get a flu shot each year. Get a tetanus shot every 10 years.     Nutrition:    Eat at least 5 servings of fruits and vegetables daily.     Eat whole-grain bread, whole-wheat pasta and brown rice instead of white grains and rice.     Get adequate Calcium and Vitamin D.     Lifestyle    Exercise for at least 150 minutes a week (30 minutes a day, 5 days a week). This will help you control your weight and prevent disease.     Limit alcohol to one drink per day.  "    No smoking.     Wear sunscreen to prevent skin cancer.     See your dentist every six months for an exam and cleaning.     See your eye doctor every 1 to 2 years.    These are new changes to your current plan of care based on today's visit:    Medications stopped    Medications to be started Possibly Atorvastatin for lipid management in light of pre-diabetes   Change dose of this medication    New treatments        Follow up appointments:    1.  FOLLOW UP WITH YOUR PRIMARY CARE PROVIDER: ENID Adrian MD                  Follow-ups after your visit        Your next 10 appointments already scheduled     Nov 02, 2018  4:30 PM CDT   New Visit with Jay Vazquez MD   [facility]    [id number] [place] [address] [phone]              Who to contact     If you have questions or need follow up information about today's clinic visit or your schedule please contact [facility] directly at [phone].  Normal or non-critical lab and imaging results will be communicated to you by [employer], letter or phone within 4 business days after the clinic has received the results. If you do not hear from us within 7 days, please contact the clinic through [employer] or phone. If you have a critical or abnormal lab result, we will notify you by phone as soon as possible.  Submit refill requests through [facility] or call your pharmacy and they will forward the refill request to us. Please allow 3 business days for your refill to be completed.          Additional Information About Your Visit        [facility] Information     [facility] lets you send messages to your doctor, view your test results, renew your prescriptions, schedule appointments and more. To sign up, go to [email or web address] . Click on \"Log in\" on the left side of the screen, which will take you to the Welcome page. Then click on \"Sign up Now\" on the right side of the page. " "    You will be asked to enter the access code listed below, as well as some personal information. Please follow the directions to create your username and password.     Your access code is: [email or web address]  Expires: 2018  9:22 AM     Your access code will  in 90 days. If you need help or a new code, please call your [place] clinic or [phone].        Care EveryWhere ID     This is your Care EveryWhere ID. This could be used by other organizations to access your [place] medical records  [id number]        Your Vitals Were     Pulse Temperature Respirations Height Pulse Oximetry BMI (Body Mass Index)    79 97.5  F (36.4  C) (Temporal) 18 5' 10.5\" (1.791 m) 95% 41.29 kg/m2       Blood Pressure from Last 3 Encounters:   18 114/74   18 132/80   18 126/70    Weight from Last 3 Encounters:   18 291 lb 14.4 oz (132.4 kg)   18 287 lb (130.2 kg)   18 297 lb 11.2 oz (135 kg)              We Performed the Following     Albumin Random Urine Quantitative with Creat Ratio     CBC with platelets     Comprehensive metabolic panel     Hemoglobin A1c     HIV Screening     Lipid panel reflex to direct LDL Fasting     TSH with free T4 reflex          Today's Medication Changes          These changes are accurate as of 18  9:22 AM.  If you have any questions, ask your nurse or doctor.               Stop taking these medicines if you haven't already. Please contact your care team if you have questions.     doxycycline 100 MG capsule   Commonly known as:  VIBRAMYCIN   Stopped by:  Juancarlos Adrian MD           guaiFENesin 600 MG 12 hr tablet   Commonly known as:  MUCINEX   Stopped by:  Juancarlos Adrian MD           guaiFENesin-dextromethorphan 100-10 MG/5ML syrup   Commonly known as:  ROBITUSSIN DM   Stopped by:  Juancarlos Adrian MD                    Primary Care Provider Office Phone # Fax #    Dyan Alston [phone] [address] [facility] " [place] [address]  [facility] [address]        Equal Access to Services     JE SMITH : Hadii pedro cuellar reddyleda Padilla, waaxda bacilio, qaybta maribellmabryan herringjonathonnacho harley. So [facility] [phone].    ATENCIÓN: Si habla español, tiene a orellana disposición servicios gratuitos de asistencia lingüística. Llame al [phone].    We comply with applicable federal civil rights laws and [place] laws. We do not discriminate on the basis of race, color, national origin, age, disability, sex, sexual orientation, or gender identity.            Thank you!     Thank you for choosing [facility]  for your care. Our goal is always to provide you with excellent care. Hearing back from our patients is one way we can continue to improve our services. Please take a few minutes to complete the written survey that you may receive in the mail after your visit with us. Thank you!             Your Updated Medication List - Protect others around you: Learn how to safely use, store and throw away your medicines at www.disposemymeds.org.          This list is accurate as of 8/6/18  9:22 AM.  Always use your most recent med list.                   Brand Name Dispense Instructions for use Diagnosis    albuterol 108 (90 Base) MCG/ACT Inhaler    PROAIR HFA/PROVENTIL HFA/VENTOLIN HFA     Inhale 2 puffs into the lungs as needed for shortness of breath / dyspnea or wheezing        FLONASE 50 MCG/ACT spray   Generic drug:  fluticasone      Spray 2 sprays into both nostrils daily.        RESTASIS MULTIDOSE 0.05 % ophthalmic emulsion   Generic drug:  cycloSPORINE           SYMBICORT 160-4.5 MCG/ACT Inhaler   Generic drug:  budesonide-formoterol      Inhale 2 puffs into the lungs 2 times daily.        vitamin D3 95322 UNITS capsule    CHOLECALCIFEROL     Take 50,000 Units by mouth

## 2018-08-06 NOTE — LETTER
My Asthma Action Plan  Name: Abraham Thibodeaux   YOB: 1970  Date: 8/6/2018   My doctor: Juancarlos Adrian MD   My clinic: [facility]        My Control Medicine: Budesonide + formoterol (Symbicort) -  160/4.5 mcg 2 puffs every 12 hours  My Rescue Medicine: Albuterol (Proair/Ventolin/Proventil) inhaler 2 puffs every 4 hours as needed  My Oral Steroid Medicine: Prednisone if needed for exacerbations My Asthma Severity: mild persistent  Avoid your asthma triggers: animal dander               GREEN ZONE   Good Control    I feel good    No cough or wheeze    Can work, sleep and play without asthma symptoms       Take your asthma control medicine every day.     1. If exercise triggers your asthma, take your rescue medication    15 minutes before exercise or sports, and    During exercise if you have asthma symptoms  2. Spacer to use with inhaler: If you have a spacer, make sure to use it with your inhaler             YELLOW ZONE Getting Worse  I have ANY of these:    I do not feel good    Cough or wheeze    Chest feels tight    Wake up at night   1. Keep taking your Green Zone medications  2. Start taking your rescue medicine:    every 20 minutes for up to 1 hour. Then every 4 hours for 24-48 hours.  3. If you stay in the Yellow Zone for more than 12-24 hours, contact your doctor.  4. If you do not return to the Green Zone in 12-24 hours or you get worse, start taking your oral steroid medicine if prescribed by your provider.           RED ZONE Medical Alert - Get Help  I have ANY of these:    I feel awful    Medicine is not helping    Breathing getting harder    Trouble walking or talking    Nose opens wide to breathe       1. Take your rescue medicine NOW  2. If your provider has prescribed an oral steroid medicine, start taking it NOW  3. Call your doctor NOW  4. If you are still in the Red Zone after 20 minutes and you have not reached your doctor:    Take your rescue medicine again  and    Call 911 or go to the emergency room right away    See your regular doctor within 2 weeks of an Emergency Room or Urgent Care visit for follow-up treatment.          Annual Reminders:  Meet with Asthma Educator,  Flu Shot in the Fall, consider Pneumonia Vaccination for patients with asthma (aged 19 and older).    Pharmacy:    [id number] - [place], [place] - [address] AT [facility] [address] [place] [facility] - [address]                      Asthma Triggers  How To Control Things That Make Your Asthma Worse    Triggers are things that make your asthma worse.  Look at the list below to help you find your triggers and what you can do about them.  You can help prevent asthma flare-ups by staying away from your triggers.      Trigger                                                          What you can do   Cigarette Smoke  Tobacco smoke can make asthma worse. Do not allow smoking in your home, car or around you.  Be sure no one smokes at a child s day care or school.  If you smoke, ask your health care provider for ways to help you quit.  Ask family members to quit too.  Ask your health care provider for a referral to Quit Plan to help you quit smoking, or call 6-892-775-PLAN.     Colds, Flu, Bronchitis  These are common triggers of asthma. Wash your hands often.  Don t touch your eyes, nose or mouth.  Get a flu shot every year.     Dust Mites  These are tiny bugs that live in cloth or carpet. They are too small to see. Wash sheets and blankets in hot water every week.   Encase pillows and mattress in dust mite proof covers.  Avoid having carpet if you can. If you have carpet, vacuum weekly.   Use a dust mask and HEPA vacuum.   Pollen and Outdoor Mold  Some people are allergic to trees, grass, or weed pollen, or molds. Try to keep your windows closed.  Limit time out doors when pollen count is high.   Ask you health care provider about taking medicine during  allergy season.     Animal Dander  Some people are allergic to skin flakes, urine or saliva from pets with fur or feathers. Keep pets with fur or feathers out of your home.    If you can t keep the pet outdoors, then keep the pet out of your bedroom.  Keep the bedroom door closed.  Keep pets off cloth furniture and away from stuffed toys.     Mice, Rats, and Cockroaches  Some people are allergic to the waste from these pests.   Cover food and garbage.  Clean up spills and food crumbs.  Store grease in the refrigerator.   Keep food out of the bedroom.   Indoor Mold  This can be a trigger if your home has high moisture. Fix leaking faucets, pipes, or other sources of water.   Clean moldy surfaces.  Dehumidify basement if it is damp and smelly.   Smoke, Strong Odors, and Sprays  These can reduce air quality. Stay away from strong odors and sprays, such as perfume, powder, hair spray, paints, smoke incense, paint, cleaning products, candles and new carpet.   Exercise or Sports  Some people with asthma have this trigger. Be active!  Ask your doctor about taking medicine before sports or exercise to prevent symptoms.    Warm up for 5-10 minutes before and after sports or exercise.     Other Triggers of Asthma  Cold air:  Cover your nose and mouth with a scarf.  Sometimes laughing or crying can be a trigger.  Some medicines and food can trigger asthma.

## 2018-08-06 NOTE — LETTER
August 7, 2018      Abraham LANIER Filing  [address]  [place] [facility] [address]      Dear Smooth Rosario is a copy of the laboratory studies from this week with results as follows:    1.  The urine screen for abnormal protein related to prediabetes was negative and normal.  2.  The HIV screening test was nonreactive as anticipated  3.  Blood glucose was normal at 97.  The A1c which averages out blood glucose levels over time is improved but still at 5.8%.Normal is less than 5.7%.  This is improved from 8 months ago when the level was 6.3.   4.  The liver function and kidney function are normal.  Thyroid studies were normal.  CBC was normal showing no anemia.  5.  The lipid profile was improved from 8 months ago.  Cholesterol is only 162 and the LDL is below 100 at 95.  Despite the levels of cholesterol being quite acceptable in general, when you have prediabetes or diabetes you have increased cardiovascular risk.  For that reason I am including a prescription for Atorvastatin 10 mg to take 1 daily.  This medication will further lower the cholesterol but more importantly will provide some cardiovascular protection over time.    I would recommend follow-up in 6 months with monitoring the prediabetic state along with the lipid profile.  Your plan for long-term weight loss and exercise will invariably be beneficial for resolving the prediabetic state. Please call with any questions or concerns and thank you for your confidence.        Sincerely,      Juancarlos Adrian MD

## 2018-08-06 NOTE — PATIENT INSTRUCTIONS
Preventive Health Recommendations  Male Ages 50   64    Yearly exam:             See your health care provider every year in order to  o   Review health changes.   o   Discuss preventive care.    o   Review your medicines if your doctor has prescribed any.     Have a cholesterol test every 5 years, or more frequently if you are at risk for high cholesterol/heart disease.     Have a diabetes test (fasting glucose) every three years. If you are at risk for diabetes, you should have this test more often.     Have a colonoscopy at age 50, or have a yearly FIT test (stool test). These exams will check for colon cancer.      Talk with your health care provider about whether or not a prostate cancer screening test (PSA) is right for you.    You should be tested each year for STDs (sexually transmitted diseases), if you re at risk.     Shots: Get a flu shot each year. Get a tetanus shot every 10 years.     Nutrition:    Eat at least 5 servings of fruits and vegetables daily.     Eat whole-grain bread, whole-wheat pasta and brown rice instead of white grains and rice.     Get adequate Calcium and Vitamin D.     Lifestyle    Exercise for at least 150 minutes a week (30 minutes a day, 5 days a week). This will help you control your weight and prevent disease.     Limit alcohol to one drink per day.     No smoking.     Wear sunscreen to prevent skin cancer.     See your dentist every six months for an exam and cleaning.     See your eye doctor every 1 to 2 years.    These are new changes to your current plan of care based on today's visit:    Medications stopped    Medications to be started Possibly Atorvastatin for lipid management in light of pre-diabetes   Change dose of this medication    New treatments        Follow up appointments:    1.  FOLLOW UP WITH YOUR PRIMARY CARE PROVIDER: ENID Adrian MD

## 2018-08-07 PROBLEM — R73.03 PREDIABETES: Status: ACTIVE | Noted: 2018-08-07

## 2018-08-07 LAB — HIV 1+2 AB+HIV1 P24 AG SERPL QL IA: NONREACTIVE

## 2018-08-07 RX ORDER — ATORVASTATIN CALCIUM 10 MG/1
10 TABLET, FILM COATED ORAL DAILY
Qty: 90 TABLET | Refills: 1 | Status: SHIPPED | OUTPATIENT
Start: 2018-08-07 | End: 2019-05-21

## 2018-08-07 ASSESSMENT — ASTHMA QUESTIONNAIRES: ACT_TOTALSCORE: 22

## 2019-05-21 ENCOUNTER — OFFICE VISIT (OUTPATIENT)
Dept: FAMILY MEDICINE | Facility: CLINIC | Age: 49
End: 2019-05-21
Payer: COMMERCIAL

## 2019-05-21 ENCOUNTER — ANCILLARY PROCEDURE (OUTPATIENT)
Dept: GENERAL RADIOLOGY | Facility: CLINIC | Age: 49
End: 2019-05-21
Attending: PREVENTIVE MEDICINE
Payer: COMMERCIAL

## 2019-05-21 VITALS
OXYGEN SATURATION: 95 % | DIASTOLIC BLOOD PRESSURE: 78 MMHG | BODY MASS INDEX: 42.14 KG/M2 | WEIGHT: 301 LBS | HEART RATE: 92 BPM | HEIGHT: 71 IN | TEMPERATURE: 98.4 F | SYSTOLIC BLOOD PRESSURE: 133 MMHG

## 2019-05-21 DIAGNOSIS — E78.5 HYPERLIPIDEMIA LDL GOAL <100: ICD-10-CM

## 2019-05-21 DIAGNOSIS — R53.83 FATIGUE, UNSPECIFIED TYPE: ICD-10-CM

## 2019-05-21 DIAGNOSIS — J20.9 ACUTE BRONCHITIS WITH SYMPTOMS > 10 DAYS: Primary | ICD-10-CM

## 2019-05-21 DIAGNOSIS — E66.01 MORBID OBESITY (H): ICD-10-CM

## 2019-05-21 DIAGNOSIS — R73.03 PREDIABETES: ICD-10-CM

## 2019-05-21 DIAGNOSIS — G47.33 OSA ON CPAP: ICD-10-CM

## 2019-05-21 DIAGNOSIS — J20.9 ACUTE BRONCHITIS WITH SYMPTOMS > 10 DAYS: ICD-10-CM

## 2019-05-21 PROCEDURE — 99214 OFFICE O/P EST MOD 30 MIN: CPT | Performed by: PREVENTIVE MEDICINE

## 2019-05-21 PROCEDURE — 71046 X-RAY EXAM CHEST 2 VIEWS: CPT

## 2019-05-21 RX ORDER — AZITHROMYCIN 250 MG/1
TABLET, FILM COATED ORAL
Qty: 6 TABLET | Refills: 0 | Status: SHIPPED | OUTPATIENT
Start: 2019-05-21 | End: 2019-12-03

## 2019-05-21 ASSESSMENT — MIFFLIN-ST. JEOR: SCORE: 2239.52

## 2019-05-21 ASSESSMENT — PAIN SCALES - GENERAL: PAINLEVEL: NO PAIN (0)

## 2019-05-21 NOTE — PROGRESS NOTES
Subjective     Abraham Thibodeaux is a 48 year old male who presents to clinic today for the following health issues:    HPI   RESPIRATORY SYMPTOMS      Duration: x 3 weeks    Description  Cough with phlegm and fatigue/malaise    Severity: severe    Accompanying signs and symptoms: left eye skin tag    History (predisposing factors):  asthma    Precipitating or alleviating factors: 4 year old sleeps with him every night    Therapies tried and outcome:  Inhalers and working out x 2 times a week    Wife recovering from breast cancer and hence is concerned     Works out with a  for weight loss    Lump on the eye 2 weeks    Gets congestion in the winter months    Has used macrolides in the past     Lots of fatigue    Sleep Apnea;  -using CPAP  -Has a 4 year old who sleeps in the same bed   -Sleep is disrupted     Obesity:  -exercises with a      {  Patient Active Problem List   Diagnosis     Morbid obesity with BMI of 45.0-49.9, adult (H)     Mild persistent asthma without complication     CAMILO on CPAP     Hyperlipidemia LDL goal <100     Prediabetes     Morbid obesity (H)     History reviewed. No pertinent surgical history.    Social History     Tobacco Use     Smoking status: Never Smoker     Smokeless tobacco: Never Used   Substance Use Topics     Alcohol use: No     Family History   Problem Relation Age of Onset     Hypertension Mother          Current Outpatient Medications   Medication Sig Dispense Refill     albuterol (PROAIR HFA/PROVENTIL HFA/VENTOLIN HFA) 108 (90 BASE) MCG/ACT Inhaler Inhale 2 puffs into the lungs as needed for shortness of breath / dyspnea or wheezing       azithromycin (ZITHROMAX) 250 MG tablet Two tablets first day, then one tablet daily for four days. 6 tablet 0     budesonide-formoterol (SYMBICORT) 160-4.5 MCG/ACT inhaler Inhale 2 puffs into the lungs 2 times daily.       fluticasone (FLONASE) 50 MCG/ACT nasal spray Spray 2 sprays into both nostrils daily.        "RESTASIS MULTIDOSE 0.05 % ophthalmic emulsion   2     Allergies   Allergen Reactions     Cat Hair Extract Cough, Difficulty breathing and Shortness Of Breath     Recent Labs   Lab Test 08/06/18  0926 11/27/17  1035   A1C 5.8* 6.3*   LDL 95 121*   HDL 33* 32*   TRIG 172* 242*   ALT 51  --    CR 0.72  --    GFRESTIMATED >90  --    GFRESTBLACK >90  --    POTASSIUM 4.2  --    TSH 0.61  --       BP Readings from Last 3 Encounters:   05/21/19 133/78   08/06/18 114/74   04/12/18 132/80    Wt Readings from Last 3 Encounters:   05/21/19 136.5 kg (301 lb)   08/06/18 132.4 kg (291 lb 14.4 oz)   04/12/18 130.2 kg (287 lb)                    Reviewed and updated as needed this visit by Provider  Tobacco  Allergies  Meds  Problems  Med Hx  Surg Hx  Fam Hx         Review of Systems   ROS COMP: Constitutional, HEENT, cardiovascular, pulmonary, gi and gu systems are negative, except as otherwise noted.      Objective    /78   Pulse 92   Temp 98.4  F (36.9  C) (Oral)   Ht 1.791 m (5' 10.5\")   Wt 136.5 kg (301 lb)   SpO2 95%   BMI 42.58 kg/m    Body mass index is 42.58 kg/m .  Physical Exam     GENERAL APPEARANCE: healthy, alert and no distress  EYES: Eyes grossly normal to inspection and conjunctivae and sclerae normal  HENT: nose and mouth without ulcers or lesions  NECK: no adenopathy and trachea midline and normal to palpation  RESP: lungs clear to auscultation - no rales, rhonchi or wheezes  CV: regular rates and rhythm, normal S1 S2, no S3 or S4 and no murmur, click or rub  ABDOMEN: soft, non-tender and no rebound or guarding   MS: extremities normal- no gross deformities noted and peripheral pulses normal  SKIN: no suspicious lesions or rashes  NEURO: Normal strength and tone, mentation intact and speech normal  PSYCH: mentation appears normal    Diagnostic Test Results:  Labs reviewed in Epic  No results found for this or any previous visit (from the past 24 hour(s)).        Assessment & Plan     Diagnoses " "and all orders for this visit:    Acute bronchitis with symptoms > 10 days  -     azithromycin (ZITHROMAX) 250 MG tablet; Two tablets first day, then one tablet daily for four days.  -     XR Chest 2 Views; Future  -Await results of Chest X ray  -ER precautions: chest pain, shortness of breath     Fatigue, unspecified type  -     CBC with platelets differential; Future  -     Comprehensive metabolic panel; Future  -     Vitamin D Deficiency; Future  -     Vitamin B12; Future  -     TSH with free T4 reflex; Future  -await labs  -will come in for future fasting labs     CAMILO on CPAP  -consistent use discussed     Prediabetes  -     Hemoglobin A1c; Future  -     Lipid panel reflex to direct LDL Fasting; Future  -Last HbA1C was 5.8    Morbid obesity (H)  -has gained weight since last time     Hyperlipidemia LDL goal <100  -     Lipid panel reflex to direct LDL Fasting; Future         BMI:   Estimated body mass index is 42.58 kg/m  as calculated from the following:    Height as of this encounter: 1.791 m (5' 10.5\").    Weight as of this encounter: 136.5 kg (301 lb).   Weight management plan: Discussed healthy diet and exercise guidelines        See Patient Instructions    Return in about 1 week (around 5/28/2019), or if symptoms worsen or fail to improve.    Dori Chirinos MD MPH    [facility]    "

## 2019-05-21 NOTE — PATIENT INSTRUCTIONS
At [facility], we strive to deliver an exceptional experience to you, every time we see you.  If you receive a survey in the mail, please send us back your thoughts. We really do value your feedback.    Your care team:                            Family Medicine Internal Medicine   MD Jem Negro MD Shantel Branch-Fleming, MD Katya Georgiev PA-C Megan Hill, APRN CARLOS Mahmood MD Pediatrics   Al Estevez, PATY Santiago, MD Milvia Hoff APRN CNP   MD Courtney Westfall MD Deborah Mielke, MD Daniela Burt, APRN [facility]      Clinic hours: Monday - Thursday 7 am-7 pm; Fridays 7 am-5 pm.   Urgent care: Monday - Friday 11 am-9 pm; Saturday and Sunday 9 am-5 pm.  Pharmacy : Monday -Thursday 8 am-8 pm; Friday 8 am-6 pm; Saturday and Sunday 9 am-5 pm.     Clinic: [phone]   Pharmacy: [phone]

## 2019-05-21 NOTE — RESULT ENCOUNTER NOTE
Abraham,     Chest X ray did not show any acute infections or pneumonias. Slight loss of air in the left lung base. Complete the antibiotics as prescribed.     Please do not hesitate to call us at [phone] if you have any questions or concerns.    Thank you,    Dori Chirinos MD MPH

## 2019-05-22 DIAGNOSIS — R53.83 FATIGUE, UNSPECIFIED TYPE: ICD-10-CM

## 2019-05-22 DIAGNOSIS — R73.03 PREDIABETES: ICD-10-CM

## 2019-05-22 DIAGNOSIS — E78.5 HYPERLIPIDEMIA LDL GOAL <100: ICD-10-CM

## 2019-05-22 LAB
ALBUMIN SERPL-MCNC: 3.6 G/DL (ref 3.4–5)
ALP SERPL-CCNC: 71 U/L (ref 40–150)
ALT SERPL W P-5'-P-CCNC: 82 U/L (ref 0–70)
ANION GAP SERPL CALCULATED.3IONS-SCNC: 8 MMOL/L (ref 3–14)
AST SERPL W P-5'-P-CCNC: 72 U/L (ref 0–45)
BASOPHILS # BLD AUTO: 0 10E9/L (ref 0–0.2)
BASOPHILS NFR BLD AUTO: 0.4 %
BILIRUB SERPL-MCNC: 0.6 MG/DL (ref 0.2–1.3)
BUN SERPL-MCNC: 11 MG/DL (ref 7–30)
CALCIUM SERPL-MCNC: 8.7 MG/DL (ref 8.5–10.1)
CHLORIDE SERPL-SCNC: 108 MMOL/L (ref 94–109)
CHOLEST SERPL-MCNC: 194 MG/DL
CO2 SERPL-SCNC: 25 MMOL/L (ref 20–32)
CREAT SERPL-MCNC: 0.64 MG/DL (ref 0.66–1.25)
DEPRECATED CALCIDIOL+CALCIFEROL SERPL-MC: 25 UG/L (ref 20–75)
DIFFERENTIAL METHOD BLD: NORMAL
EOSINOPHIL # BLD AUTO: 0.2 10E9/L (ref 0–0.7)
EOSINOPHIL NFR BLD AUTO: 1.7 %
ERYTHROCYTE [DISTWIDTH] IN BLOOD BY AUTOMATED COUNT: 13.2 % (ref 10–15)
GFR SERPL CREATININE-BSD FRML MDRD: >90 ML/MIN/{1.73_M2}
GLUCOSE SERPL-MCNC: 126 MG/DL (ref 70–99)
HBA1C MFR BLD: 6.6 % (ref 0–5.6)
HCT VFR BLD AUTO: 44.5 % (ref 40–53)
HDLC SERPL-MCNC: 34 MG/DL
HGB BLD-MCNC: 14.6 G/DL (ref 13.3–17.7)
IMM GRANULOCYTES # BLD: 0.1 10E9/L (ref 0–0.4)
IMM GRANULOCYTES NFR BLD: 0.8 %
LDLC SERPL CALC-MCNC: 121 MG/DL
LYMPHOCYTES # BLD AUTO: 2.9 10E9/L (ref 0.8–5.3)
LYMPHOCYTES NFR BLD AUTO: 32 %
MCH RBC QN AUTO: 30.7 PG (ref 26.5–33)
MCHC RBC AUTO-ENTMCNC: 32.8 G/DL (ref 31.5–36.5)
MCV RBC AUTO: 94 FL (ref 78–100)
MONOCYTES # BLD AUTO: 0.6 10E9/L (ref 0–1.3)
MONOCYTES NFR BLD AUTO: 6.3 %
NEUTROPHILS # BLD AUTO: 5.3 10E9/L (ref 1.6–8.3)
NEUTROPHILS NFR BLD AUTO: 58.8 %
NONHDLC SERPL-MCNC: 160 MG/DL
PLATELET # BLD AUTO: 253 10E9/L (ref 150–450)
POTASSIUM SERPL-SCNC: 3.7 MMOL/L (ref 3.4–5.3)
PROT SERPL-MCNC: 7.4 G/DL (ref 6.8–8.8)
RBC # BLD AUTO: 4.75 10E12/L (ref 4.4–5.9)
SODIUM SERPL-SCNC: 141 MMOL/L (ref 133–144)
TRIGL SERPL-MCNC: 193 MG/DL
TSH SERPL DL<=0.005 MIU/L-ACNC: 0.79 MU/L (ref 0.4–4)
VIT B12 SERPL-MCNC: 683 PG/ML (ref 193–986)
WBC # BLD AUTO: 9.1 10E9/L (ref 4–11)

## 2019-05-22 PROCEDURE — 80053 COMPREHEN METABOLIC PANEL: CPT | Performed by: PREVENTIVE MEDICINE

## 2019-05-22 PROCEDURE — 36415 COLL VENOUS BLD VENIPUNCTURE: CPT | Performed by: PREVENTIVE MEDICINE

## 2019-05-22 PROCEDURE — 82306 VITAMIN D 25 HYDROXY: CPT | Performed by: PREVENTIVE MEDICINE

## 2019-05-22 PROCEDURE — 83036 HEMOGLOBIN GLYCOSYLATED A1C: CPT | Performed by: PREVENTIVE MEDICINE

## 2019-05-22 PROCEDURE — 84443 ASSAY THYROID STIM HORMONE: CPT | Performed by: PREVENTIVE MEDICINE

## 2019-05-22 PROCEDURE — 85025 COMPLETE CBC W/AUTO DIFF WBC: CPT | Performed by: PREVENTIVE MEDICINE

## 2019-05-22 PROCEDURE — 82607 VITAMIN B-12: CPT | Performed by: PREVENTIVE MEDICINE

## 2019-05-22 PROCEDURE — 80061 LIPID PANEL: CPT | Performed by: PREVENTIVE MEDICINE

## 2019-05-22 ASSESSMENT — ASTHMA QUESTIONNAIRES: ACT_TOTALSCORE: 20

## 2019-05-22 NOTE — RESULT ENCOUNTER NOTE
Abraham,     Three month glucose number is just in a diabetic range now, increased from 5.8 to 6.6. I would recommend follow up to discuss this further.  Basic blood count does not show anemia or infection.  Electrolytes, thyroid function and kidney function are normal.  Liver function is borderline.   Cholesterol also needs improvement.   Other labs are pending.     Please do not hesitate to call us at [phone] if you have any questions or concerns.    Thank you,    Dori Chirinos MD MPH

## 2019-05-22 NOTE — RESULT ENCOUNTER NOTE
Abraham,     Vitamin B 12 levels are normal.     Please do not hesitate to call us at [phone] if you have any questions or concerns.    Thank you,    Dori Chirinos MD MPH

## 2019-05-23 NOTE — RESULT ENCOUNTER NOTE
Abraham,     Vitamin D levels are in the normal range.     Please do not hesitate to call us at [phone] if you have any questions or concerns.    Thank you,    Dori Chirinos MD MPH

## 2019-11-13 ENCOUNTER — OFFICE VISIT (OUTPATIENT)
Dept: UROLOGY | Facility: OTHER | Age: 49
End: 2019-11-13
Payer: COMMERCIAL

## 2019-11-13 VITALS
OXYGEN SATURATION: 96 % | TEMPERATURE: 98.3 F | DIASTOLIC BLOOD PRESSURE: 84 MMHG | HEART RATE: 68 BPM | SYSTOLIC BLOOD PRESSURE: 139 MMHG | RESPIRATION RATE: 16 BRPM

## 2019-11-13 DIAGNOSIS — R35.0 FREQUENT URINATION: Primary | ICD-10-CM

## 2019-11-13 LAB
ALBUMIN UR-MCNC: NEGATIVE MG/DL
APPEARANCE UR: CLEAR
BILIRUB UR QL STRIP: NEGATIVE
COLOR UR AUTO: YELLOW
GLUCOSE UR STRIP-MCNC: NEGATIVE MG/DL
HGB UR QL STRIP: ABNORMAL
KETONES UR STRIP-MCNC: NEGATIVE MG/DL
LEUKOCYTE ESTERASE UR QL STRIP: NEGATIVE
NITRATE UR QL: NEGATIVE
PH UR STRIP: 5.5 PH (ref 5–7)
PSA SERPL-MCNC: 0.91 UG/L (ref 0–4)
RBC #/AREA URNS AUTO: NORMAL /HPF
SOURCE: ABNORMAL
SP GR UR STRIP: 1.02 (ref 1–1.03)
UROBILINOGEN UR STRIP-ACNC: 0.2 EU/DL (ref 0.2–1)
WBC #/AREA URNS AUTO: NORMAL /HPF

## 2019-11-13 PROCEDURE — 84153 ASSAY OF PSA TOTAL: CPT | Performed by: UROLOGY

## 2019-11-13 PROCEDURE — 81001 URINALYSIS AUTO W/SCOPE: CPT | Performed by: UROLOGY

## 2019-11-13 PROCEDURE — 36415 COLL VENOUS BLD VENIPUNCTURE: CPT | Performed by: UROLOGY

## 2019-11-13 PROCEDURE — 51798 US URINE CAPACITY MEASURE: CPT | Performed by: UROLOGY

## 2019-11-13 PROCEDURE — 99203 OFFICE O/P NEW LOW 30 MIN: CPT | Mod: 25 | Performed by: UROLOGY

## 2019-11-13 ASSESSMENT — PAIN SCALES - GENERAL: PAINLEVEL: MILD PAIN (2)

## 2019-11-13 NOTE — PROGRESS NOTES
Bladder Scan performed. 37mL maximum residual urine detected after 3 scans. MD informed. .Maida Patel RN

## 2019-11-13 NOTE — PROGRESS NOTES
S: Abraham Thibodeaux is a pleasant  49 year old male who was seen  for a consult with regard to patient's urinary complaints.  Patient complains of Sensation of incomplete emptying, Frequency, Intermittency and slower urinary stream.  He has no history of elevated PSA.  Symptoms have been on going for   1 years(s).  Seems to be worsened over time.  His recent PSA was found to be   PSA   Date Value Ref Range Status   11/27/2017 0.86 0 - 4 ug/L Final     Comment:     Assay Method:  Chemiluminescence using Siemens Vista analyzer   .  His AUA Symptom Score:  20.  He has nod dysuria or hematuria.    Current Outpatient Medications   Medication Sig Dispense Refill     albuterol (PROAIR HFA/PROVENTIL HFA/VENTOLIN HFA) 108 (90 BASE) MCG/ACT Inhaler Inhale 2 puffs into the lungs as needed for shortness of breath / dyspnea or wheezing       budesonide-formoterol (SYMBICORT) 160-4.5 MCG/ACT inhaler Inhale 2 puffs into the lungs 2 times daily.       fluticasone (FLONASE) 50 MCG/ACT nasal spray Spray 2 sprays into both nostrils daily.       RESTASIS MULTIDOSE 0.05 % ophthalmic emulsion   2     azithromycin (ZITHROMAX) 250 MG tablet Two tablets first day, then one tablet daily for four days. (Patient not taking: Reported on 11/13/2019) 6 tablet 0     Allergies   Allergen Reactions     Cat Hair Extract Cough, Difficulty breathing and Shortness Of Breath     Past Medical History:   Diagnosis Date     Hyperlipidemia LDL goal <100      Mild persistent asthma      Morbid obesity (H)      CAMILO on CPAP      Prediabetes      No past surgical history on file.   Family History   Problem Relation Age of Onset     Hypertension Mother      He does not have a family history of prostate cancer.  Social History     Socioeconomic History     Marital status:      Spouse name: None     Number of children: None     Years of education: None     Highest education level: None   Occupational History     None   Social Needs     Financial resource  strain: None     Food insecurity:     Worry: None     Inability: None     Transportation needs:     Medical: None     Non-medical: None   Tobacco Use     Smoking status: Never Smoker     Smokeless tobacco: Never Used   Substance and Sexual Activity     Alcohol use: No     Drug use: None     Sexual activity: Yes     Partners: Female     Comment:     Lifestyle     Physical activity:     Days per week: None     Minutes per session: None     Stress: None   Relationships     Social connections:     Talks on phone: None     Gets together: None     Attends Nondenominational service: None     Active member of club or organization: None     Attends meetings of clubs or organizations: None     Relationship status: None     Intimate partner violence:     Fear of current or ex partner: None     Emotionally abused: None     Physically abused: None     Forced sexual activity: None   Other Topics Concern     Parent/sibling w/ CABG, MI or angioplasty before 65F 55M? Not Asked   Social History Narrative     None        REVIEW OF SYSTEMS  =================  C: NEGATIVE for fever, chills, change in weight  I: NEGATIVE for worrisome rashes, moles or lesions  E/M: NEGATIVE for ear, mouth and throat problems  R: NEGATIVE for significant cough or SHORTNESS OF BREATH  CV:  NEGATIVE for chest pain, palpitations or peripheral edema  GI: NEGATIVE for nausea, abdominal pain, heartburn, or change in bowel habits  NEURO: NEGATIVE numbness/weakness  : see HPI  PSYCH: NEGATIVE depression/anxiety  LYmph: no new enlarged lymph nodes  Ortho: no new trauma/movements           O: Exam:/84 (BP Location: Left arm, Patient Position: Sitting, Cuff Size: Adult Large)   Pulse 68   Temp 98.3  F (36.8  C) (Oral)   Resp 16   SpO2 96%    Constitutional: healthy, alert and no distress  Cardiovascular: negative, PMI normal.   Respiratory: negative, no evidence of respiratory distress  Gastrointestinal: Abdomen soft, non-tender. BS normal. No masses,  organomegaly  : penis no discharge. Testis no masses.  No scrotal skin lesion.  Prostate 40 gm smooth.  Musculoskeletal: extremities normal- no gross deformities noted, gait normal and normal muscle tone  Skin: no suspicious lesions or rashes  Neurologic: Alert and oriented  Psychiatric: mentation appears normal. and affect normal/bright  Hematologic/Lymphatic/Immunologic: normal ant/post cervical, axillary, supraclavicular and inguinal nodes    Assessment/Plan:   (R35.0) Frequent urination  (primary encounter diagnosis)  Comment: early benign prostatic hyperplasia.  UA neg.  Bladder scan < 50 ml.  psa today.      Recommendations:   Medical management versus surgical management versus office treatments, were discussed with patient at length. Pros and Cons discussed. Patient elects observation.  RTC if symptoms worsen.

## 2019-11-15 ENCOUNTER — MYC MEDICAL ADVICE (OUTPATIENT)
Dept: UROLOGY | Facility: OTHER | Age: 49
End: 2019-11-15

## 2019-12-03 ENCOUNTER — OFFICE VISIT (OUTPATIENT)
Dept: FAMILY MEDICINE | Facility: CLINIC | Age: 49
End: 2019-12-03
Payer: COMMERCIAL

## 2019-12-03 VITALS
HEART RATE: 78 BPM | OXYGEN SATURATION: 95 % | HEIGHT: 71 IN | TEMPERATURE: 98.6 F | SYSTOLIC BLOOD PRESSURE: 136 MMHG | DIASTOLIC BLOOD PRESSURE: 75 MMHG | WEIGHT: 298.2 LBS | BODY MASS INDEX: 41.75 KG/M2 | RESPIRATION RATE: 20 BRPM

## 2019-12-03 DIAGNOSIS — E66.01 MORBID OBESITY WITH BMI OF 45.0-49.9, ADULT (H): ICD-10-CM

## 2019-12-03 DIAGNOSIS — J30.2 SEASONAL ALLERGIC RHINITIS, UNSPECIFIED TRIGGER: ICD-10-CM

## 2019-12-03 DIAGNOSIS — J45.30 MILD PERSISTENT ASTHMA WITHOUT COMPLICATION: ICD-10-CM

## 2019-12-03 DIAGNOSIS — Z00.00 ROUTINE GENERAL MEDICAL EXAMINATION AT A HEALTH CARE FACILITY: Primary | ICD-10-CM

## 2019-12-03 DIAGNOSIS — R73.03 PREDIABETES: ICD-10-CM

## 2019-12-03 PROCEDURE — 99396 PREV VISIT EST AGE 40-64: CPT | Performed by: FAMILY MEDICINE

## 2019-12-03 RX ORDER — ALBUTEROL SULFATE 90 UG/1
2 AEROSOL, METERED RESPIRATORY (INHALATION) EVERY 4 HOURS PRN
Qty: 2 INHALER | Refills: 5 | Status: SHIPPED | OUTPATIENT
Start: 2019-12-03

## 2019-12-03 RX ORDER — BUDESONIDE AND FORMOTEROL FUMARATE DIHYDRATE 160; 4.5 UG/1; UG/1
2 AEROSOL RESPIRATORY (INHALATION) 2 TIMES DAILY
Qty: 3 INHALER | Refills: 3 | Status: SHIPPED | OUTPATIENT
Start: 2019-12-03 | End: 2022-10-03

## 2019-12-03 RX ORDER — FLUTICASONE PROPIONATE 50 MCG
SPRAY, SUSPENSION (ML) NASAL
Qty: 32 G | Refills: 5 | Status: SHIPPED | OUTPATIENT
Start: 2019-12-03 | End: 2021-02-16

## 2019-12-03 RX ORDER — CYCLOSPORINE 0.5 MG/ML
EMULSION OPHTHALMIC
Refills: 2 | Status: CANCELLED | OUTPATIENT
Start: 2019-12-03

## 2019-12-03 ASSESSMENT — MIFFLIN-ST. JEOR: SCORE: 2221.82

## 2019-12-03 ASSESSMENT — PAIN SCALES - GENERAL: PAINLEVEL: NO PAIN (0)

## 2019-12-03 NOTE — PROGRESS NOTES
3  SUBJECTIVE:   CC: Abraham Thibodeaux is an 49 year old male who presents for preventive health visit.     Healthy Habits:    Do you get at least three servings of calcium containing foods daily (dairy, green leafy vegetables, etc.)? yes    Amount of exercise or daily activities, outside of work: 3 day(s) per week    Problems taking medications regularly No    Medication side effects: No    Have you had an eye exam in the past two years? yes    Do you see a dentist twice per year? yes    Do you have sleep apnea, excessive snoring or daytime drowsiness?yes          Today's PHQ-2 Score:   PHQ-2 ( 1999 [employer]) 12/3/2019 5/21/2019   Q1: Little interest or pleasure in doing things 0 0   Q2: Feeling down, depressed or hopeless 0 0   PHQ-2 Score 0 0   Q1: Little interest or pleasure in doing things - -   Q2: Feeling down, depressed or hopeless - -   PHQ-2 Score - -       Abuse: Current or Past(Physical, Sexual or Emotional)- No  Do you feel safe in your environment? Yes        Social History     Tobacco Use     Smoking status: Never Smoker     Smokeless tobacco: Never Used   Substance Use Topics     Alcohol use: No     If you drink alcohol do you typically have >3 drinks per day or >7 drinks per week? No                      Last PSA:   PSA   Date Value Ref Range Status   11/13/2019 0.91 0 - 4 ug/L Final     Comment:     Assay Method:  Chemiluminescence using Siemens Vista analyzer       Reviewed orders with patient. Reviewed health maintenance and updated orders accordingly - Yes  Lab work is in process  Labs reviewed in EPIC  BP Readings from Last 3 Encounters:   12/03/19 136/75   11/13/19 139/84   05/21/19 133/78    Wt Readings from Last 3 Encounters:   12/03/19 135.3 kg (298 lb 3.2 oz)   05/21/19 136.5 kg (301 lb)   08/06/18 132.4 kg (291 lb 14.4 oz)                    Reviewed and updated as needed this visit by clinical staff  Tobacco  Allergies  Meds         Reviewed and updated as needed this visit by  "Provider        Here today for routine checkup and follow-up on sugar and cholesterol.  Patient is well-known to me though I have not seen him in a couple of years.  Had some lab work done earlier this year that showed that his A1c had gone up to 6.6.  Since that time is been working out routinely and is down 10 to 15 pounds and hoping he is back down to the prediabetes range.  Has not lost as much weight as he would like but is in much better physical shape and eating better.    ROS:  CONSTITUTIONAL: NEGATIVE for fever, chills, change in weight  INTEGUMENTARY/SKIN: NEGATIVE for worrisome rashes, moles or lesions  EYES: NEGATIVE for vision changes or irritation  ENT: NEGATIVE for ear, mouth and throat problems  RESP: NEGATIVE for significant cough or SOB  CV: NEGATIVE for chest pain, palpitations or peripheral edema  GI: NEGATIVE for nausea, abdominal pain, heartburn, or change in bowel habits   male: negative for dysuria, hematuria, decreased urinary stream, erectile dysfunction, urethral discharge  MUSCULOSKELETAL: NEGATIVE for significant arthralgias or myalgia  NEURO: NEGATIVE for weakness, dizziness or paresthesias  PSYCHIATRIC: NEGATIVE for changes in mood or affect    OBJECTIVE:   /75 (BP Location: Right arm, Patient Position: Sitting, Cuff Size: Adult Large)   Pulse 78   Temp 98.6  F (37  C) (Oral)   Resp 20   Ht 1.791 m (5' 10.5\")   Wt 135.3 kg (298 lb 3.2 oz)   SpO2 95%   BMI 42.18 kg/m    EXAM:  GENERAL: healthy, alert, no distress and obese  EYES: Eyes grossly normal to inspection, PERRL and conjunctivae and sclerae normal  HENT: ear canals and TM's normal, nose and mouth without ulcers or lesions  NECK: no adenopathy, no asymmetry, masses, or scars and thyroid normal to palpation  RESP: lungs clear to auscultation - no rales, rhonchi or wheezes  CV: regular rate and rhythm, normal S1 S2, no S3 or S4, no murmur, click or rub, no peripheral edema and peripheral pulses strong  ABDOMEN: soft, " "nontender, no hepatosplenomegaly, no masses and bowel sounds normal  MS: no gross musculoskeletal defects noted, no edema  SKIN: no suspicious lesions or rashes  NEURO: Normal strength and tone, mentation intact and speech normal  PSYCH: mentation appears normal, affect normal/bright    Diagnostic Test Results:  Labs reviewed in Epic    ASSESSMENT/PLAN:   1. Routine general medical examination at a health care facility  Up-to-date on colonoscopy and recently had normal PSA.  Working on vascular risks.    2. Prediabetes    - Comprehensive metabolic panel  - Lipid panel reflex to direct LDL Fasting  - Hemoglobin A1c    3. Morbid obesity with BMI of 45.0-49.9, adult (H)      4. Mild persistent asthma without complication    - budesonide-formoterol (SYMBICORT) 160-4.5 MCG/ACT Inhaler; Inhale 2 puffs into the lungs 2 times daily  Dispense: 3 Inhaler; Refill: 3  - albuterol (PROAIR HFA/PROVENTIL HFA/VENTOLIN HFA) 108 (90 Base) MCG/ACT inhaler; Inhale 2 puffs into the lungs every 4 hours as needed for shortness of breath / dyspnea or wheezing  Dispense: 2 Inhaler; Refill: 5    5. Seasonal allergic rhinitis, unspecified trigger    - fluticasone (FLONASE) 50 MCG/ACT nasal spray; Spray 2 sprays into both nostrils daily.  Dispense: 32 g; Refill: 5    COUNSELING:  Reviewed preventive health counseling, as reflected in patient instructions       Regular exercise       Healthy diet/nutrition       Vision screening       Hearing screening       Colon cancer screening       Prostate cancer screening    Estimated body mass index is 42.18 kg/m  as calculated from the following:    Height as of this encounter: 1.791 m (5' 10.5\").    Weight as of this encounter: 135.3 kg (298 lb 3.2 oz).    Weight management plan: Discussed healthy diet and exercise guidelines     reports that he has never smoked. He has never used smokeless tobacco.      Counseling Resources:  ATP IV Guidelines  Pooled Cohorts Equation Calculator  FRAX Risk " Assessment  ICSI Preventive Guidelines  Dietary Guidelines for Americans, 2010  USDA's MyPlate  ASA Prophylaxis  Lung CA Screening    Dyan Alston MD  [facility]

## 2019-12-31 DIAGNOSIS — R73.03 PREDIABETES: ICD-10-CM

## 2019-12-31 LAB
ALBUMIN SERPL-MCNC: 3.8 G/DL (ref 3.4–5)
ALP SERPL-CCNC: 79 U/L (ref 40–150)
ALT SERPL W P-5'-P-CCNC: 57 U/L (ref 0–70)
ANION GAP SERPL CALCULATED.3IONS-SCNC: 10 MMOL/L (ref 3–14)
AST SERPL W P-5'-P-CCNC: 41 U/L (ref 0–45)
BILIRUB SERPL-MCNC: 0.5 MG/DL (ref 0.2–1.3)
BUN SERPL-MCNC: 11 MG/DL (ref 7–30)
CALCIUM SERPL-MCNC: 8.8 MG/DL (ref 8.5–10.1)
CHLORIDE SERPL-SCNC: 109 MMOL/L (ref 94–109)
CHOLEST SERPL-MCNC: 196 MG/DL
CO2 SERPL-SCNC: 21 MMOL/L (ref 20–32)
CREAT SERPL-MCNC: 0.79 MG/DL (ref 0.66–1.25)
GFR SERPL CREATININE-BSD FRML MDRD: >90 ML/MIN/{1.73_M2}
GLUCOSE SERPL-MCNC: 151 MG/DL (ref 70–99)
HBA1C MFR BLD: 6.3 % (ref 0–5.6)
HDLC SERPL-MCNC: 33 MG/DL
LDLC SERPL CALC-MCNC: 133 MG/DL
NONHDLC SERPL-MCNC: 163 MG/DL
POTASSIUM SERPL-SCNC: 4.1 MMOL/L (ref 3.4–5.3)
PROT SERPL-MCNC: 7.9 G/DL (ref 6.8–8.8)
SODIUM SERPL-SCNC: 140 MMOL/L (ref 133–144)
TRIGL SERPL-MCNC: 151 MG/DL

## 2019-12-31 PROCEDURE — 36415 COLL VENOUS BLD VENIPUNCTURE: CPT | Performed by: FAMILY MEDICINE

## 2019-12-31 PROCEDURE — 80061 LIPID PANEL: CPT | Performed by: FAMILY MEDICINE

## 2019-12-31 PROCEDURE — 83036 HEMOGLOBIN GLYCOSYLATED A1C: CPT | Performed by: FAMILY MEDICINE

## 2019-12-31 PROCEDURE — 80053 COMPREHEN METABOLIC PANEL: CPT | Performed by: FAMILY MEDICINE

## 2020-08-03 ENCOUNTER — VIRTUAL VISIT (OUTPATIENT)
Dept: FAMILY MEDICINE | Facility: CLINIC | Age: 50
End: 2020-08-03
Payer: COMMERCIAL

## 2020-08-03 DIAGNOSIS — R07.89 ATYPICAL CHEST PAIN: Primary | ICD-10-CM

## 2020-08-03 DIAGNOSIS — E78.5 HYPERLIPIDEMIA LDL GOAL <70: ICD-10-CM

## 2020-08-03 DIAGNOSIS — R93.1 AGATSTON CORONARY ARTERY CALCIUM SCORE LESS THAN 100: ICD-10-CM

## 2020-08-03 PROCEDURE — 99214 OFFICE O/P EST MOD 30 MIN: CPT | Mod: 95 | Performed by: INTERNAL MEDICINE

## 2020-08-03 ASSESSMENT — ASTHMA QUESTIONNAIRES
QUESTION_5 LAST FOUR WEEKS HOW WOULD YOU RATE YOUR ASTHMA CONTROL: WELL CONTROLLED
QUESTION_4 LAST FOUR WEEKS HOW OFTEN HAVE YOU USED YOUR RESCUE INHALER OR NEBULIZER MEDICATION (SUCH AS ALBUTEROL): ONCE A WEEK OR LESS
EMERGENCY_ROOM_LAST_YEAR_TOTAL: ONE
ACT_TOTALSCORE: 21
QUESTION_1 LAST FOUR WEEKS HOW MUCH OF THE TIME DID YOUR ASTHMA KEEP YOU FROM GETTING AS MUCH DONE AT WORK, SCHOOL OR AT HOME: NONE OF THE TIME
QUESTION_2 LAST FOUR WEEKS HOW OFTEN HAVE YOU HAD SHORTNESS OF BREATH: ONCE OR TWICE A WEEK
QUESTION_3 LAST FOUR WEEKS HOW OFTEN DID YOUR ASTHMA SYMPTOMS (WHEEZING, COUGHING, SHORTNESS OF BREATH, CHEST TIGHTNESS OR PAIN) WAKE YOU UP AT NIGHT OR EARLIER THAN USUAL IN THE MORNING: ONCE OR TWICE

## 2020-08-03 ASSESSMENT — PAIN SCALES - GENERAL: PAINLEVEL: NO PAIN (1)

## 2020-08-03 NOTE — PROGRESS NOTES
"Abraham Thibodeaux is a 50 year old male who is being evaluated via a billable video visit.      The patient has been notified of following:     \"This video visit will be conducted via a call between you and your physician/provider. We have found that certain health care needs can be provided without the need for an in-person physical exam.  This service lets us provide the care you need with a video conversation.  If a prescription is necessary we can send it directly to your pharmacy.  If lab work is needed we can place an order for that and you can then stop by our lab to have the test done at a later time.    Video visits are billed at different rates depending on your insurance coverage.  Please reach out to your insurance provider with any questions.    If during the course of the call the physician/provider feels a video visit is not appropriate, you will not be charged for this service.\"    Patient has given verbal consent for Video visit? Yes  How would you like to obtain your AVS? MyChart  If you are dropped from the video visit, the video invite should be resent to: Send to e-mail at: [email or web address]   Will anyone else be joining your video visit? No    Subjective     Abraham Thibodeaux is a 50 year old male who presents today via video visit for the following health issues:    HPI    Asthma Follow-Up    Was ACT completed today?    Yes    ACT Total Scores 8/3/2020   ACT TOTAL SCORE (Goal Greater than or Equal to 20) 21   In the past 12 months, how many times did you visit the emergency room for your asthma without being admitted to the hospital? 1   In the past 12 months, how many times were you hospitalized overnight because of your asthma? 0         How many days per week do you miss taking your asthma controller medication?  0    Please describe any recent triggers for your asthma: cats, exercise induced-overly exerted    Have you had any Emergency Room Visits, Urgent Care Visits, or Hospital " Admissions since your last office visit?  Yes  Number of ER or Urgent Care visits for asthma: 1      How many servings of fruits and vegetables do you eat daily?  0-1    On average, how many sweetened beverages do you drink each day (Examples: soda, juice, sweet tea, etc.  Do NOT count diet or artificially sweetened beverages)?   1    How many days per week do you exercise enough to make your heart beat faster? 7    How many minutes a day do you exercise enough to make your heart beat faster? 9 or less    How many days per week do you miss taking your medication? 0    CHEST PAIN     Onset: 3 weeks    Description:   Location:  Left chest  Character: achy pressure  Radiation: none  Duration: constant     Intensity: mild    Progression of Symptoms:  constant    Accompanying Signs & Symptoms:  Shortness of breath: YES  Sweating: no   Nausea/vomiting: no   Lightheadedness: no   Palpitations: no  Fever/Chills: no   Cough: no   Heartburn: no    Tired: Yes, lethargiv   History:   Family history of heart disease YES- father  Tobacco use: no   Echo- negative    Precipitating factors:   Worse with exertion: no   Worse with deep breaths :  no   Related to food: no     Alleviating factors:  none       Therapies Tried and outcome: none      Video Start Time: 7:55 AM        Patient Active Problem List   Diagnosis     Morbid obesity with BMI of 45.0-49.9, adult (H)     Mild persistent asthma without complication     CAMILO on CPAP     Hyperlipidemia LDL goal <100     Prediabetes     Seasonal allergic rhinitis, unspecified trigger     History reviewed. No pertinent surgical history.    Social History     Tobacco Use     Smoking status: Never Smoker     Smokeless tobacco: Never Used   Substance Use Topics     Alcohol use: No     Family History   Problem Relation Age of Onset     Hypertension Mother          Allergies   Allergen Reactions     Cat Hair Extract Cough, Difficulty breathing and Shortness Of Breath     Recent Labs   Lab Test  12/31/19  1051 05/22/19  0823 08/06/18  0926   A1C 6.3* 6.6* 5.8*   * 121* 95   HDL 33* 34* 33*   TRIG 151* 193* 172*   ALT 57 82* 51   CR 0.79 0.64* 0.72   GFRESTIMATED >90 >90 >90   GFRESTBLACK >90 >90 >90   POTASSIUM 4.1 3.7 4.2   TSH  --  0.79 0.61      BP Readings from Last 3 Encounters:   12/03/19 136/75   11/13/19 139/84   05/21/19 133/78    Wt Readings from Last 3 Encounters:   12/03/19 135.3 kg (298 lb 3.2 oz)   05/21/19 136.5 kg (301 lb)   08/06/18 132.4 kg (291 lb 14.4 oz)                    Reviewed and updated as needed this visit by Provider         Review of Systems   CONSTITUTIONAL: NEGATIVE for fever, chills, change in weight  INTEGUMENTARY/SKIN: NEGATIVE for worrisome rashes, moles or lesions  EYES: NEGATIVE for vision changes or irritation  ENT/MOUTH: NEGATIVE for ear, mouth and throat problems  RESP: NEGATIVE for significant cough or SOB  CV: POSITIVE for chest pain/chest pressure and NEGATIVE for claudication, cyanosis, diaphoresis, irregular heart beat, lower extremity edema, orthopnea, palpitations and paroxysmal nocturnal dyspnea  GI: NEGATIVE for nausea, abdominal pain, heartburn, or change in bowel habits  : NEGATIVE for frequency, dysuria, or hematuria  MUSCULOSKELETAL: NEGATIVE for significant arthralgias or myalgia  NEURO: NEGATIVE for weakness, dizziness or paresthesias  ENDOCRINE: NEGATIVE for temperature intolerance, skin/hair changes  HEME: NEGATIVE for bleeding problems  PSYCHIATRIC: NEGATIVE for changes in mood or affect      Objective    Vitals - Patient Reported  Pain Score: No Pain (1)        Physical Exam     GENERAL: Healthy, obese, alert and no distress  EYES: Eyes grossly normal to inspection and EOMI  RESP: Not in respiratory distress.  CV: Not examined due to nature of visit.  NEURO: Mentation and speech appropriate for age.  PSYCH: Mentation appears normal, affect normal/bright, judgement and insight intact, normal speech and appearance  "well-groomed.      Diagnostic Test Results:  Epic reviewed        Assessment & Plan     1. Atypical chest pain    - CT Coronary Calcium Scan; Future    2. Agatston coronary artery calcium score less than 100    -Cardiology referral to -.    3. Hyperlipidemia LDL goal < 70    -Treatment-naive. Start statins due to abnormal CT Coronary calcium scan.      BMI:   Estimated body mass index is 42.18 kg/m  as calculated from the following:    Height as of 12/3/19: 1.791 m (5' 10.5\").    Weight as of 12/3/19: 135.3 kg (298 lb 3.2 oz).   Weight management plan: diet and exercise.        FUTURE APPOINTMENTS:       - Follow-up visit in 2 weeks or earlier as needed.      Jem Ross MD  [facility]      Video-Visit Details    Type of service:  Video Visit    Video End Time:8:17 AM    Originating Location (pt. Location): Home    Distant Location (provider location):  [facility]     Platform used for Video Visit: Kosta Ross MD        "

## 2020-08-04 ASSESSMENT — ASTHMA QUESTIONNAIRES: ACT_TOTALSCORE: 21

## 2020-08-05 ENCOUNTER — ANCILLARY PROCEDURE (OUTPATIENT)
Dept: CT IMAGING | Facility: CLINIC | Age: 50
End: 2020-08-05
Attending: INTERNAL MEDICINE

## 2020-08-05 DIAGNOSIS — R07.89 ATYPICAL CHEST PAIN: ICD-10-CM

## 2020-08-05 PROCEDURE — 75571 CT HRT W/O DYE W/CA TEST: CPT | Performed by: INTERNAL MEDICINE

## 2020-08-06 PROBLEM — R93.1 AGATSTON CORONARY ARTERY CALCIUM SCORE LESS THAN 100: Status: ACTIVE | Noted: 2020-08-06

## 2020-08-06 RX ORDER — ATORVASTATIN CALCIUM 10 MG/1
10 TABLET, FILM COATED ORAL DAILY
Qty: 90 TABLET | Refills: 3 | Status: SHIPPED | OUTPATIENT
Start: 2020-08-06 | End: 2020-08-21

## 2020-08-21 ENCOUNTER — VIRTUAL VISIT (OUTPATIENT)
Dept: CARDIOLOGY | Facility: CLINIC | Age: 50
End: 2020-08-21
Payer: COMMERCIAL

## 2020-08-21 DIAGNOSIS — R07.89 ATYPICAL CHEST PAIN: ICD-10-CM

## 2020-08-21 DIAGNOSIS — R93.1 AGATSTON CORONARY ARTERY CALCIUM SCORE LESS THAN 100: ICD-10-CM

## 2020-08-21 DIAGNOSIS — E78.5 HYPERLIPIDEMIA LDL GOAL <70: ICD-10-CM

## 2020-08-21 PROCEDURE — 99203 OFFICE O/P NEW LOW 30 MIN: CPT | Mod: 95 | Performed by: INTERNAL MEDICINE

## 2020-08-21 RX ORDER — ATORVASTATIN CALCIUM 40 MG/1
40 TABLET, FILM COATED ORAL DAILY
Qty: 90 TABLET | Refills: 3 | Status: SHIPPED | OUTPATIENT
Start: 2020-08-21 | End: 2021-10-01

## 2020-08-21 NOTE — PATIENT INSTRUCTIONS
Increase Lipitor to 40 mg daily.  Start aspirin 81 mg daily.  Our research coordinator, Bang, will call you about the Precise study.  We will stay in touch.

## 2020-08-21 NOTE — PROGRESS NOTES
"Abraham Thibodeaux is a 50 year old male who is being evaluated via a billable video visit.      The patient has been notified of following:     \"This video visit will be conducted via a call between you and your physician/provider. We have found that certain health care needs can be provided without the need for an in-person physical exam.  This service lets us provide the care you need with a video conversation.  If a prescription is necessary we can send it directly to your pharmacy.  If lab work is needed we can place an order for that and you can then stop by our lab to have the test done at a later time.    Video visits are billed at different rates depending on your insurance coverage.  Please reach out to your insurance provider with any questions.    If during the course of the call the physician/provider feels a video visit is not appropriate, you will not be charged for this service.\"    Patient has given verbal consent for Video visit? Yes  How would you like to obtain your AVS? MyChart  If you are dropped from the video visit, the video invite should be resent to: Send to e-mail at: [email or web address]  Will anyone else be joining your video visit? No        Video-Visit Details    Type of service:  Video Visit    Video Start Time: 8:30 AM  Video End Time: 8:54 AM    Originating Location (pt. Location): Home    Distant Location (provider location):  [place]     Platform used for Video Visit: Kosta Tejeda MD     [facility] Cardiology Consultation:         Assessment and Plan:     1. Atypical chest pain, however patient at intermediate risk given his risk factors.  He needs further evaluation for obstructive CAD.  Will discuss about the PRECISE study for CAD screening that compares the usual strategy of stress testing versus coronary CT: if he get randomized to the usual strategy, then we will order an exercise stress echo.    2. Subclinical coronary " artery disease --calcium score 42  Will start aspirin 81 and increase lipitor to 40 mg daily    3. Hyperlipidemia- , calcium score 42. ASCVD 6.8%  Atorvastatin 40 daily as above.    4. Prediabetes -- HbA1C 6.3  Not on any medication.   Continue lifestyle modification    5. Severe obesity -- BMI 40.  Patient is adopting a healthier lifestyle.    6. CAMILO. Continue CPAP    Case  discussed with Dr. Tejeda    [facility]    I have seen and examined the patient, reviewed labs and tests. I have discussed my findings and treatment recommendations with the house staff and/or Cardiology fellow and agree with their assessment and plan as outlined in the note.      Kobe Tejeda MD    Cardiac Imaging and Prevention  [facility]  Pager: [phone]        Reason for consultation: Chest pain      HPI: Abraham Thibodeaux is a 50 year old year old male who is here to establish care.     His medical history is relevant for hyperlipidemia (, calcium score 42), prediabetes (HbA1C 6.3 on 12/31/2019), CAMILO on CPAP, severe obesity (BMI 40) and asthma.     His chest discomfort is at the upper left corner of chest, has been going on continuously for the last 6 weeks, and is not related to exertion, inspiration or position. No nausea, vomiting or diaphoresis.    He is starting to exercise and have a healthier diet.  Wife is a cancer survivor, and it has been a stressful period of his life.    FH: Father had elective CABG*3 in his 60s. Paternal grandfather is diabetic.    He presented to the ED on 7/29/2020 for chest discomfort. EKG was normal as by ED note. Normal troponins. Patient discharged from the ED    He denies having  shortness of breath, orthopnea, cough, edema, palpitations, PND, lightheadedness or syncope.  No significant weight gain or loss.      EXAM:  GEN/CONSTITUIONAL: Appears comfortable, in no apparent distress   EYES: No icterus noted.   JVP:   Not visible  RESPIRATORY: No cough or labored breathing   NEUROLOGIC: No tremor noted  PSYCHIATRIC: Normal affect  EXT: No edema noted.  Skin: No rash visible  The rest of a comprehensive physical examination is deferred due to PHE (public health emergency) video visit restrictions.      PAST MEDICAL HISTORY:  Past Medical History:   Diagnosis Date     Hyperlipidemia LDL goal <100      Mild persistent asthma      Morbid obesity (H)      CAMILO on CPAP      Prediabetes        CURRENT MEDICATIONS:  Current Outpatient Medications   Medication     albuterol (PROAIR HFA/PROVENTIL HFA/VENTOLIN HFA) 108 (90 Base) MCG/ACT inhaler     atorvastatin (LIPITOR) 10 MG tablet     budesonide-formoterol (SYMBICORT) 160-4.5 MCG/ACT Inhaler     fluticasone (FLONASE) 50 MCG/ACT nasal spray     RESTASIS MULTIDOSE 0.05 % ophthalmic emulsion     No current facility-administered medications for this visit.        PAST SURGICAL HISTORY:  No past surgical history on file.    ALLERGIES     Allergies   Allergen Reactions     Cat Hair Extract Cough, Difficulty breathing and Shortness Of Breath       FAMILY HISTORY:  Family History   Problem Relation Age of Onset     Hypertension Mother        SOCIAL HISTORY:  Social History     Socioeconomic History     Marital status:      Spouse name: Not on file     Number of children: Not on file     Years of education: Not on file     Highest education level: Not on file   Occupational History     Not on file   Social Needs     Financial resource strain: Not on file     Food insecurity     Worry: Not on file     Inability: Not on file     Transportation needs     Medical: Not on file     Non-medical: Not on file   Tobacco Use     Smoking status: Never Smoker     Smokeless tobacco: Never Used   Substance and Sexual Activity     Alcohol use: No     Drug use: Not on file     Sexual activity: Yes     Partners: Female     Birth control/protection: None     Comment:     Lifestyle     Physical activity      Days per week: Not on file     Minutes per session: Not on file     Stress: Not on file   Relationships     Social connections     Talks on phone: Not on file     Gets together: Not on file     Attends Muslim service: Not on file     Active member of club or organization: Not on file     Attends meetings of clubs or organizations: Not on file     Relationship status: Not on file     Intimate partner violence     Fear of current or ex partner: Not on file     Emotionally abused: Not on file     Physically abused: Not on file     Forced sexual activity: Not on file   Other Topics Concern     Parent/sibling w/ CABG, MI or angioplasty before 65F 55M? Not Asked   Social History Narrative     Not on file       ROS:   Constitutional: No fever, chills, or sweats. No weight gain/loss   ENT: No visual disturbance, ear ache, epistaxis, sore throat  Allergies/Immunologic: Negative.   Respiratory: No cough, hemoptysia  Cardiovascular: As per HPI  GI: No nausea, vomiting, hematemesis, melena, or hematochezia  : No urinary frequency, dysuria, or hematuria  Integument: Negative  Psychiatric: Negative  Neuro: Negative  Endocrinology: Negative   Musculoskeletal: Negative      ADDITIONAL COMMENTS:       I reviewed the patient's medications    I reviewed the patient's pertinent clinical laboratory studies:       CBC RESULTS:   Recent Labs   Lab Test 05/22/19  0823   WBC 9.1   RBC 4.75   HGB 14.6   HCT 44.5   MCV 94   MCH 30.7   MCHC 32.8   RDW 13.2          Last Comprehensive Metabolic Panel:  Sodium   Date Value Ref Range Status   12/31/2019 140 133 - 144 mmol/L Final     Potassium   Date Value Ref Range Status   12/31/2019 4.1 3.4 - 5.3 mmol/L Final     Chloride   Date Value Ref Range Status   12/31/2019 109 94 - 109 mmol/L Final     Carbon Dioxide   Date Value Ref Range Status   12/31/2019 21 20 - 32 mmol/L Final     Anion Gap   Date Value Ref Range Status   12/31/2019 10 3 - 14 mmol/L Final     Glucose   Date Value  Ref Range Status   12/31/2019 151 (H) 70 - 99 mg/dL Final     Comment:     Fasting specimen     Urea Nitrogen   Date Value Ref Range Status   12/31/2019 11 7 - 30 mg/dL Final     Creatinine   Date Value Ref Range Status   12/31/2019 0.79 0.66 - 1.25 mg/dL Final     GFR Estimate   Date Value Ref Range Status   12/31/2019 >90 >60 mL/min/[1.73_m2] Final     Comment:     Non  GFR Calc  Starting 12/18/2018, serum creatinine based estimated GFR (eGFR) will be   calculated using the Chronic Kidney Disease Epidemiology Collaboration   (CKD-EPI) equation.       Calcium   Date Value Ref Range Status   12/31/2019 8.8 8.5 - 10.1 mg/dL Final       Lab Results   Component Value Date    AST 41 12/31/2019     Lab Results   Component Value Date    ALT 57 12/31/2019     No results found for: BILICONJ   Lab Results   Component Value Date    BILITOTAL 0.5 12/31/2019     Lab Results   Component Value Date    ALBUMIN 3.8 12/31/2019     Lab Results   Component Value Date    PROTTOTAL 7.9 12/31/2019      Lab Results   Component Value Date    ALKPHOS 79 12/31/2019         No results found for: TROPI    No results found for: INR     TSH   Date Value Ref Range Status   05/22/2019 0.79 0.40 - 4.00 mU/L Final   08/06/2018 0.61 0.40 - 4.00 mU/L Final       Cholesterol   Date Value Ref Range Status   12/31/2019 196 <200 mg/dL Final   05/22/2019 194 <200 mg/dL Final     HDL Cholesterol   Date Value Ref Range Status   12/31/2019 33 (L) >39 mg/dL Final   05/22/2019 34 (L) >39 mg/dL Final     LDL Cholesterol Calculated   Date Value Ref Range Status   12/31/2019 133 (H) <100 mg/dL Final     Comment:     Above desirable:  100-129 mg/dl  Borderline High:  130-159 mg/dL  High:             160-189 mg/dL  Very high:       >189 mg/dl     05/22/2019 121 (H) <100 mg/dL Final     Comment:     Above desirable:  100-129 mg/dl  Borderline High:  130-159 mg/dL  High:             160-189 mg/dL  Very high:       >189 mg/dl       Triglycerides   Date  Value Ref Range Status   12/31/2019 151 (H) <150 mg/dL Final     Comment:     Borderline high:  150-199 mg/dl  High:             200-499 mg/dl  Very high:       >499 mg/dl  Fasting specimen     05/22/2019 193 (H) <150 mg/dL Final     Comment:     Borderline high:  150-199 mg/dl  High:             200-499 mg/dl  Very high:       >499 mg/dl  Fasting specimen       No results found for: CHOLHANNAH      I reviewed the patient's pertinent imaging studies:       CT calcium screening 8/5/2020  IMPRESSIONS:  1.  Mild coronary calcifications.  2.  The total Agatston calcium score is 42 placing the patient in the  76th percentile when compared to age and gender matched control group.  3.  Recommend aggressive risk factor modification.  4.  Please review Radiology report for incidental noncardiac findings  that will follow separately.      FINDINGS:     CORONARY ARTERY CALCIUM SCORES:   Total calcium score: 42  Left main coronary artery: 0  Left anterior descending coronary artery: 6  Circumflex coronary artery: 0  Right coronary artery: 36         Stress test 11/3/2017  CONCLUSIONS  Negative ECG stress test.   Normal left ventricular systolic function.   Post-stress and resting myocardial perfusion images are normal.          Santos Mayer MD  PGY-4 cardiology fellow  [facility]  [email or web address] Pager: [phone]

## 2020-08-25 DIAGNOSIS — R93.1 AGATSTON CORONARY ARTERY CALCIUM SCORE LESS THAN 100: Primary | ICD-10-CM

## 2020-08-25 DIAGNOSIS — R07.89 ATYPICAL CHEST PAIN: ICD-10-CM

## 2020-08-28 ENCOUNTER — TELEPHONE (OUTPATIENT)
Dept: CARDIOLOGY | Facility: CLINIC | Age: 50
End: 2020-08-28

## 2020-08-28 NOTE — TELEPHONE ENCOUNTER
Received message from Dr Tejeda asking to help patient schedule coronary CTA. Given scheduling phone number. Briefly discussed test and prep. He will call to schedule.   JACKI Perkins

## 2020-09-01 ENCOUNTER — HOSPITAL ENCOUNTER (OUTPATIENT)
Dept: CT IMAGING | Facility: CLINIC | Age: 50
Discharge: HOME | End: 2020-09-01
Attending: INTERNAL MEDICINE | Admitting: INTERNAL MEDICINE
Payer: COMMERCIAL

## 2020-09-01 VITALS
HEART RATE: 60 BPM | OXYGEN SATURATION: 94 % | RESPIRATION RATE: 16 BRPM | DIASTOLIC BLOOD PRESSURE: 60 MMHG | SYSTOLIC BLOOD PRESSURE: 116 MMHG

## 2020-09-01 DIAGNOSIS — R07.89 ATYPICAL CHEST PAIN: ICD-10-CM

## 2020-09-01 DIAGNOSIS — R93.1 AGATSTON CORONARY ARTERY CALCIUM SCORE LESS THAN 100: ICD-10-CM

## 2020-09-01 PROCEDURE — 25000125 ZZHC RX 250: Performed by: INTERNAL MEDICINE

## 2020-09-01 PROCEDURE — 75574 CT ANGIO HRT W/3D IMAGE: CPT | Mod: 26 | Performed by: INTERNAL MEDICINE

## 2020-09-01 PROCEDURE — 25000132 ZZH RX MED GY IP 250 OP 250 PS 637: Performed by: INTERNAL MEDICINE

## 2020-09-01 PROCEDURE — 75574 CT ANGIO HRT W/3D IMAGE: CPT

## 2020-09-01 PROCEDURE — 25000128 H RX IP 250 OP 636: Performed by: INTERNAL MEDICINE

## 2020-09-01 RX ORDER — METHYLPREDNISOLONE SODIUM SUCCINATE 125 MG/2ML
125 INJECTION, POWDER, LYOPHILIZED, FOR SOLUTION INTRAMUSCULAR; INTRAVENOUS
Status: DISCONTINUED | OUTPATIENT
Start: 2020-09-01 | End: 2020-09-02 | Stop reason: HOSPADM

## 2020-09-01 RX ORDER — ACYCLOVIR 200 MG/1
0-1 CAPSULE ORAL
Status: DISCONTINUED | OUTPATIENT
Start: 2020-09-01 | End: 2020-09-02 | Stop reason: HOSPADM

## 2020-09-01 RX ORDER — DIPHENHYDRAMINE HYDROCHLORIDE 50 MG/ML
25-50 INJECTION INTRAMUSCULAR; INTRAVENOUS
Status: DISCONTINUED | OUTPATIENT
Start: 2020-09-01 | End: 2020-09-02 | Stop reason: HOSPADM

## 2020-09-01 RX ORDER — DIPHENHYDRAMINE HCL 25 MG
25 CAPSULE ORAL
Status: DISCONTINUED | OUTPATIENT
Start: 2020-09-01 | End: 2020-09-02 | Stop reason: HOSPADM

## 2020-09-01 RX ORDER — ONDANSETRON 2 MG/ML
4 INJECTION INTRAMUSCULAR; INTRAVENOUS
Status: DISCONTINUED | OUTPATIENT
Start: 2020-09-01 | End: 2020-09-02 | Stop reason: HOSPADM

## 2020-09-01 RX ORDER — METOPROLOL TARTRATE 25 MG/1
25-100 TABLET, FILM COATED ORAL
Status: DISCONTINUED | OUTPATIENT
Start: 2020-09-01 | End: 2020-09-02 | Stop reason: HOSPADM

## 2020-09-01 RX ORDER — IOPAMIDOL 755 MG/ML
120 INJECTION, SOLUTION INTRAVASCULAR ONCE
Status: COMPLETED | OUTPATIENT
Start: 2020-09-01 | End: 2020-09-01

## 2020-09-01 RX ORDER — METOPROLOL TARTRATE 1 MG/ML
5-15 INJECTION, SOLUTION INTRAVENOUS
Status: DISCONTINUED | OUTPATIENT
Start: 2020-09-01 | End: 2020-09-02 | Stop reason: HOSPADM

## 2020-09-01 RX ORDER — NITROGLYCERIN 0.4 MG/1
.4-.8 TABLET SUBLINGUAL
Status: DISCONTINUED | OUTPATIENT
Start: 2020-09-01 | End: 2020-09-02 | Stop reason: HOSPADM

## 2020-09-01 RX ADMIN — NITROGLYCERIN 0.8 MG: 0.4 TABLET SUBLINGUAL at 08:50

## 2020-09-01 RX ADMIN — METOPROLOL TARTRATE 2 MG: 5 INJECTION INTRAVENOUS at 08:15

## 2020-09-01 RX ADMIN — IOPAMIDOL 120 ML: 755 INJECTION, SOLUTION INTRAVENOUS at 08:42

## 2020-09-01 NOTE — PROGRESS NOTES
Pt arrived for Coronary CT angiogram. Test, meds and side effects reviewed with pt. Resting HR 54-55 bpm; given 2 mg IV Metoprolol per order. Administered 0.8 mg SL nitro on CT table per order. CTA completed; tolerated procedure well and denies symptoms of allergic reaction.  Post monitoring completed and VSS.  D/C instructions reviewed with pt whom verbalized understanding of need to increase PO fluids today. D/C to gold waiting room accompanied by staff.

## 2020-12-14 ENCOUNTER — HEALTH MAINTENANCE LETTER (OUTPATIENT)
Age: 50
End: 2020-12-14

## 2021-01-15 ENCOUNTER — HEALTH MAINTENANCE LETTER (OUTPATIENT)
Age: 51
End: 2021-01-15

## 2021-10-01 DIAGNOSIS — E78.5 HYPERLIPIDEMIA LDL GOAL <70: ICD-10-CM

## 2021-10-01 RX ORDER — ATORVASTATIN CALCIUM 40 MG/1
40 TABLET, FILM COATED ORAL DAILY
Qty: 90 TABLET | Refills: 3 | Status: SHIPPED | OUTPATIENT
Start: 2021-10-01 | End: 2022-10-03

## 2021-10-01 NOTE — TELEPHONE ENCOUNTER
atorvastatin (LIPITOR) 40 MG tablet  Last Written Prescription Date:  8/21/2020  Last Fill Quantity: 90,   # refills: 3  Last Office Visit : 8/21/2020  Future Office visit:  None    Routing refill request to provider for review/approval because:  Over due office visit and labs  Refer to Provider for review     Recent Labs   Lab Test 12/31/19  1051   *       Kori Browning RN  Central Triage Red Flags/Med Refills

## 2021-10-02 ENCOUNTER — HEALTH MAINTENANCE LETTER (OUTPATIENT)
Age: 51
End: 2021-10-02

## 2021-12-19 NOTE — PATIENT INSTRUCTIONS
Medication(s) prescribed today:    Orders Placed This Encounter   Medications     doxycycline (VIBRAMYCIN) 100 MG capsule     Sig: Take 1 capsule (100 mg) by mouth 2 times daily     Dispense:  20 capsule     Refill:  0     guaiFENesin-dextromethorphan (ROBITUSSIN DM) 100-10 MG/5ML syrup     Sig: Take 10 mLs by mouth 4 times daily as needed for cough     Dispense:  560 mL     Refill:  0        pt is a 77 yo male pw wound check, sts has BLE wounds that are cellulitic and has been on abx x 1 week that haven't been working. pt noted hypothermic 93.1 rectal. ble noted red, swollen.

## 2022-01-22 ENCOUNTER — HEALTH MAINTENANCE LETTER (OUTPATIENT)
Age: 52
End: 2022-01-22

## 2022-03-21 ENCOUNTER — OFFICE VISIT (OUTPATIENT)
Dept: FAMILY MEDICINE | Facility: CLINIC | Age: 52
End: 2022-03-21
Payer: COMMERCIAL

## 2022-03-21 VITALS
SYSTOLIC BLOOD PRESSURE: 136 MMHG | BODY MASS INDEX: 42.49 KG/M2 | DIASTOLIC BLOOD PRESSURE: 74 MMHG | TEMPERATURE: 97.5 F | OXYGEN SATURATION: 97 % | RESPIRATION RATE: 16 BRPM | HEART RATE: 69 BPM | WEIGHT: 300.4 LBS

## 2022-03-21 DIAGNOSIS — R73.03 PREDIABETES: ICD-10-CM

## 2022-03-21 DIAGNOSIS — M54.50 LUMBAR BACK PAIN: Primary | ICD-10-CM

## 2022-03-21 DIAGNOSIS — R35.0 URINARY FREQUENCY: ICD-10-CM

## 2022-03-21 LAB
ALBUMIN UR-MCNC: NEGATIVE MG/DL
APPEARANCE UR: CLEAR
BILIRUB UR QL STRIP: NEGATIVE
COLOR UR AUTO: YELLOW
GLUCOSE UR STRIP-MCNC: NEGATIVE MG/DL
HBA1C MFR BLD: 6.4 % (ref 0–5.6)
HGB UR QL STRIP: NEGATIVE
KETONES UR STRIP-MCNC: NEGATIVE MG/DL
LEUKOCYTE ESTERASE UR QL STRIP: NEGATIVE
NITRATE UR QL: NEGATIVE
PH UR STRIP: 5.5 [PH] (ref 5–7)
PSA SERPL-MCNC: 0.78 UG/L (ref 0–4)
SP GR UR STRIP: >=1.03 (ref 1–1.03)
UROBILINOGEN UR STRIP-ACNC: 0.2 E.U./DL

## 2022-03-21 PROCEDURE — G0103 PSA SCREENING: HCPCS | Performed by: INTERNAL MEDICINE

## 2022-03-21 PROCEDURE — 81003 URINALYSIS AUTO W/O SCOPE: CPT | Performed by: INTERNAL MEDICINE

## 2022-03-21 PROCEDURE — 83036 HEMOGLOBIN GLYCOSYLATED A1C: CPT | Performed by: INTERNAL MEDICINE

## 2022-03-21 PROCEDURE — 99214 OFFICE O/P EST MOD 30 MIN: CPT | Performed by: INTERNAL MEDICINE

## 2022-03-21 PROCEDURE — 36415 COLL VENOUS BLD VENIPUNCTURE: CPT | Performed by: INTERNAL MEDICINE

## 2022-03-21 ASSESSMENT — ASTHMA QUESTIONNAIRES: ACT_TOTALSCORE: 18

## 2022-03-21 ASSESSMENT — PAIN SCALES - GENERAL: PAINLEVEL: SEVERE PAIN (7)

## 2022-03-21 NOTE — PROGRESS NOTES
Assessment & Plan     Lumbar back pain  Likely two issues going on - certainly msk back pain, as well as likely BPH.  Discussed conservative management for the back pain.    Urinary frequency  UA is negative.  Repeat PSA, this was last done 11/2019 and was normal.  Pending the results, consider Flomax.  - UA macro with reflex to Microscopic and Culture - Clinc Collect  - PSA, screen; Future  - PSA, screen    Prediabetes  Since we are drawing labs, will check his A1c as well.  This was 6.3% a couple years ago, stable at 6.4% today.  - Hemoglobin A1c; Future  - Hemoglobin A1c             Return in about 3 months (around 6/21/2022) for Physical Exam.    Maria Byers MD  [facility]    Ana Maria Verde is a 51 year old who presents for the following health issues     Ammon is here with low back pain and urinary frequency.  A number of years ago he saw urologist for urinary frequency which was mild at the time.  His PSA was normal.  Was told he had mild BPH and they recommended he come back if symptoms worsen to the point where he wanted to trial Flomax.    Couple weeks ago he was moving some boxes for his mother-in-law, did not have an injury per se but the next day woke up with pain across his lower back and it was hard to get out of bed.  The back is painful and quite stiff at times, for example after sitting for long periods.  The pain does not radiate.  Tylenol is helpful.    The pain seems to be musculoskeletal, but he is also concerned since around the same time he has had significant increase in urinary frequency.  For example the other morning he went to 5 times before at 9:30 in the morning.  He also has hesitancy, especially in the middle the night and in the morning, and a sense of incomplete voiding.  No hematuria, no dysuria.  He is worried about his kidneys with the pain in the back and the prostate.    He also notes his wife recently went through treatment for breast  cancer and he has an uncle with prostate issues so wants to make sure there are no underlying issues.           Review of Systems   Const, gi, gu, msk, neuro reviewed,  otherwise negative unless noted above.        Objective    /74   Pulse 69   Temp 97.5  F (36.4  C) (Tympanic)   Resp 16   Wt 136.3 kg (300 lb 6.4 oz)   SpO2 97%   BMI 42.49 kg/m    Body mass index is 42.49 kg/m .  Physical Exam   Gen: pleasant, well appearing man, no distress  MSK: no spinal, SI joint, or lumbar paraspinal muscle tenderness  Neuro: no gross deficits, Normal gait.   CV: RRR, normal S1 and S2, no murmurs  Pulm: CTAB   Abd: BS present, soft, NT, ND, no CVA tenderness        Results for orders placed or performed in visit on 03/21/22   UA macro with reflex to Microscopic and Culture - Clinc Collect     Status: Normal    Specimen: Urine, Midstream   Result Value Ref Range    Color Urine Yellow Colorless, Straw, Light Yellow, Yellow    Appearance Urine Clear Clear    Glucose Urine Negative Negative mg/dL    Bilirubin Urine Negative Negative    Ketones Urine Negative Negative mg/dL    Specific Gravity Urine >=1.030 1.003 - 1.035    Blood Urine Negative Negative    pH Urine 5.5 5.0 - 7.0    Protein Albumin Urine Negative Negative mg/dL    Urobilinogen Urine 0.2 0.2, 1.0 E.U./dL    Nitrite Urine Negative Negative    Leukocyte Esterase Urine Negative Negative    Narrative    Microscopic not indicated   Hemoglobin A1c     Status: Abnormal   Result Value Ref Range    Hemoglobin A1C 6.4 (H) 0.0 - 5.6 %

## 2022-09-03 ENCOUNTER — HEALTH MAINTENANCE LETTER (OUTPATIENT)
Age: 52
End: 2022-09-03

## 2022-10-02 ASSESSMENT — ASTHMA QUESTIONNAIRES
QUESTION_3 LAST FOUR WEEKS HOW OFTEN DID YOUR ASTHMA SYMPTOMS (WHEEZING, COUGHING, SHORTNESS OF BREATH, CHEST TIGHTNESS OR PAIN) WAKE YOU UP AT NIGHT OR EARLIER THAN USUAL IN THE MORNING: NOT AT ALL
QUESTION_4 LAST FOUR WEEKS HOW OFTEN HAVE YOU USED YOUR RESCUE INHALER OR NEBULIZER MEDICATION (SUCH AS ALBUTEROL): ONCE A WEEK OR LESS
QUESTION_5 LAST FOUR WEEKS HOW WOULD YOU RATE YOUR ASTHMA CONTROL: WELL CONTROLLED
QUESTION_2 LAST FOUR WEEKS HOW OFTEN HAVE YOU HAD SHORTNESS OF BREATH: NOT AT ALL
ACT_TOTALSCORE: 23
ACT_TOTALSCORE: 23
QUESTION_1 LAST FOUR WEEKS HOW MUCH OF THE TIME DID YOUR ASTHMA KEEP YOU FROM GETTING AS MUCH DONE AT WORK, SCHOOL OR AT HOME: NONE OF THE TIME

## 2022-10-02 ASSESSMENT — ENCOUNTER SYMPTOMS
JOINT SWELLING: 1
CHILLS: 0
HEMATURIA: 0
HEARTBURN: 0
DYSURIA: 0
NERVOUS/ANXIOUS: 0
COUGH: 0
WEAKNESS: 0
DIZZINESS: 0
NAUSEA: 0
FREQUENCY: 1
DIARRHEA: 0
SORE THROAT: 0
PALPITATIONS: 0
MYALGIAS: 0
CONSTIPATION: 0
PARESTHESIAS: 0
SHORTNESS OF BREATH: 0
HEMATOCHEZIA: 0
HEADACHES: 0
EYE PAIN: 0
ARTHRALGIAS: 1
FEVER: 0
ABDOMINAL PAIN: 0

## 2022-10-03 ENCOUNTER — OFFICE VISIT (OUTPATIENT)
Dept: FAMILY MEDICINE | Facility: CLINIC | Age: 52
End: 2022-10-03
Payer: COMMERCIAL

## 2022-10-03 VITALS
WEIGHT: 295 LBS | TEMPERATURE: 98.5 F | HEART RATE: 68 BPM | OXYGEN SATURATION: 96 % | HEIGHT: 71 IN | DIASTOLIC BLOOD PRESSURE: 72 MMHG | SYSTOLIC BLOOD PRESSURE: 124 MMHG | BODY MASS INDEX: 41.3 KG/M2 | RESPIRATION RATE: 12 BRPM

## 2022-10-03 DIAGNOSIS — R39.9 LOWER URINARY TRACT SYMPTOMS (LUTS): ICD-10-CM

## 2022-10-03 DIAGNOSIS — Z23 NEED FOR IMMUNIZATION AGAINST INFLUENZA: ICD-10-CM

## 2022-10-03 DIAGNOSIS — Z12.5 SCREENING FOR PROSTATE CANCER: ICD-10-CM

## 2022-10-03 DIAGNOSIS — Z23 HIGH PRIORITY FOR 2019-NCOV VACCINE: ICD-10-CM

## 2022-10-03 DIAGNOSIS — E66.01 MORBID OBESITY WITH BMI OF 45.0-49.9, ADULT (H): ICD-10-CM

## 2022-10-03 DIAGNOSIS — Z12.11 SPECIAL SCREENING FOR MALIGNANT NEOPLASMS, COLON: ICD-10-CM

## 2022-10-03 DIAGNOSIS — E78.5 HYPERLIPIDEMIA LDL GOAL <70: ICD-10-CM

## 2022-10-03 DIAGNOSIS — J45.30 MILD PERSISTENT ASTHMA WITHOUT COMPLICATION: ICD-10-CM

## 2022-10-03 DIAGNOSIS — R73.03 PREDIABETES: ICD-10-CM

## 2022-10-03 DIAGNOSIS — Z00.00 ROUTINE GENERAL MEDICAL EXAMINATION AT A HEALTH CARE FACILITY: Primary | ICD-10-CM

## 2022-10-03 DIAGNOSIS — Z23 NEED FOR COVID-19 VACCINE: ICD-10-CM

## 2022-10-03 DIAGNOSIS — G47.33 OSA ON CPAP: ICD-10-CM

## 2022-10-03 LAB
ANION GAP SERPL CALCULATED.3IONS-SCNC: 5 MMOL/L (ref 3–14)
BUN SERPL-MCNC: 12 MG/DL (ref 7–30)
CALCIUM SERPL-MCNC: 9.1 MG/DL (ref 8.5–10.1)
CHLORIDE BLD-SCNC: 108 MMOL/L (ref 94–109)
CHOLEST SERPL-MCNC: 160 MG/DL
CO2 SERPL-SCNC: 27 MMOL/L (ref 20–32)
CREAT SERPL-MCNC: 0.71 MG/DL (ref 0.66–1.25)
FASTING STATUS PATIENT QL REPORTED: YES
GFR SERPL CREATININE-BSD FRML MDRD: >90 ML/MIN/1.73M2
GLUCOSE BLD-MCNC: 119 MG/DL (ref 70–99)
HBA1C MFR BLD: 6.3 % (ref 0–5.6)
HDLC SERPL-MCNC: 34 MG/DL
LDLC SERPL CALC-MCNC: 82 MG/DL
NONHDLC SERPL-MCNC: 126 MG/DL
POTASSIUM BLD-SCNC: 3.9 MMOL/L (ref 3.4–5.3)
PSA SERPL-MCNC: 0.94 UG/L (ref 0–4)
SODIUM SERPL-SCNC: 140 MMOL/L (ref 133–144)
TRIGL SERPL-MCNC: 219 MG/DL

## 2022-10-03 PROCEDURE — 99214 OFFICE O/P EST MOD 30 MIN: CPT | Mod: 25 | Performed by: FAMILY MEDICINE

## 2022-10-03 PROCEDURE — 90471 IMMUNIZATION ADMIN: CPT | Performed by: FAMILY MEDICINE

## 2022-10-03 PROCEDURE — 80061 LIPID PANEL: CPT | Performed by: FAMILY MEDICINE

## 2022-10-03 PROCEDURE — 83036 HEMOGLOBIN GLYCOSYLATED A1C: CPT | Performed by: FAMILY MEDICINE

## 2022-10-03 PROCEDURE — 80048 BASIC METABOLIC PNL TOTAL CA: CPT | Performed by: FAMILY MEDICINE

## 2022-10-03 PROCEDURE — 36415 COLL VENOUS BLD VENIPUNCTURE: CPT | Performed by: FAMILY MEDICINE

## 2022-10-03 PROCEDURE — 91312 COVID-19,PF,PFIZER BOOSTER BIVALENT: CPT | Performed by: FAMILY MEDICINE

## 2022-10-03 PROCEDURE — 0124A COVID-19,PF,PFIZER BOOSTER BIVALENT: CPT | Performed by: FAMILY MEDICINE

## 2022-10-03 PROCEDURE — 90682 RIV4 VACC RECOMBINANT DNA IM: CPT | Performed by: FAMILY MEDICINE

## 2022-10-03 PROCEDURE — 99396 PREV VISIT EST AGE 40-64: CPT | Performed by: FAMILY MEDICINE

## 2022-10-03 PROCEDURE — G0103 PSA SCREENING: HCPCS | Performed by: FAMILY MEDICINE

## 2022-10-03 RX ORDER — ATORVASTATIN CALCIUM 40 MG/1
40 TABLET, FILM COATED ORAL DAILY
Qty: 90 TABLET | Refills: 3 | Status: SHIPPED | OUTPATIENT
Start: 2022-10-03 | End: 2023-10-02

## 2022-10-03 RX ORDER — TAMSULOSIN HYDROCHLORIDE 0.4 MG/1
0.4 CAPSULE ORAL DAILY
Qty: 30 CAPSULE | Refills: 1 | Status: SHIPPED | OUTPATIENT
Start: 2022-10-03 | End: 2022-12-15

## 2022-10-03 RX ORDER — BUDESONIDE AND FORMOTEROL FUMARATE DIHYDRATE 160; 4.5 UG/1; UG/1
2 AEROSOL RESPIRATORY (INHALATION) 2 TIMES DAILY
Qty: 30 G | Refills: 1 | Status: SHIPPED | OUTPATIENT
Start: 2022-10-03

## 2022-10-03 ASSESSMENT — ENCOUNTER SYMPTOMS
COUGH: 0
SORE THROAT: 0
WEAKNESS: 0
HEMATURIA: 0
HEARTBURN: 0
PALPITATIONS: 0
FREQUENCY: 1
JOINT SWELLING: 1
EYE PAIN: 0
CHILLS: 0
HEADACHES: 0
NAUSEA: 0
NERVOUS/ANXIOUS: 0
DIZZINESS: 0
MYALGIAS: 0
DIARRHEA: 0
SHORTNESS OF BREATH: 0
CONSTIPATION: 0
DYSURIA: 0
FLANK PAIN: 0
ABDOMINAL PAIN: 0
HEMATOCHEZIA: 0
ARTHRALGIAS: 1
PARESTHESIAS: 0
DIFFICULTY URINATING: 0
FEVER: 0

## 2022-10-03 ASSESSMENT — PAIN SCALES - GENERAL: PAINLEVEL: NO PAIN (0)

## 2022-10-03 NOTE — PROGRESS NOTES
SUBJECTIVE:   CC: Ammon is an 52 year old who presents for preventative health visit.     Patient has been advised of split billing requirements and indicates understanding: Yes  Healthy Habits:     Getting at least 3 servings of Calcium per day:  Yes    Bi-annual eye exam:  Yes    Dental care twice a year:  Yes    Sleep apnea or symptoms of sleep apnea:  Excessive snoring and Sleep apnea    Diet:  Regular (no restrictions)    Frequency of exercise:  2-3 days/week    Duration of exercise:  45-60 minutes    Taking medications regularly:  Yes    Medication side effects:  Not applicable    PHQ-2 Total Score: 0    Patient reports having lots symptoms.  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  Is interfering with his life.  Would like to consider Flomax.    Uses CPAP.    Asthma controlled.  ACT today of 23.    No other questions or concerns.    Today's PHQ-2 Score:   PHQ-2 ( 1999 Pfizer) 10/2/2022   Q1: Little interest or pleasure in doing things 0   Q2: Feeling down, depressed or hopeless 0   PHQ-2 Score 0   PHQ-2 Total Score (12-17 Years)- Positive if 3 or more points; Administer PHQ-A if positive -   Q1: Little interest or pleasure in doing things Not at all   Q2: Feeling down, depressed or hopeless Not at all   PHQ-2 Score 0     Abuse: Current or Past(Physical, Sexual or Emotional)- No  Do you feel safe in your environment? Yes    Have you ever done Advance Care Planning? (For example, a Health Directive, POLST, or a discussion with a medical provider or your loved ones about your wishes): Yes, patient states has an Advance Care Planning document and will bring a copy to the clinic.    Social History     Tobacco Use     Smoking status: Never Smoker     Smokeless tobacco: Never Used   Substance Use Topics     Alcohol use: No     Alcohol Use 10/2/2022   Prescreen: >3 drinks/day or >7 drinks/week? No   Prescreen: >3 drinks/day or >7 drinks/week? -     Last  PSA:   PSA   Date Value Ref Range Status   11/13/2019 0.91 0 - 4 ug/L Final     Comment:     Assay Method:  Chemiluminescence using Siemens Vista analyzer     Prostate Specific Antigen Screen   Date Value Ref Range Status   03/21/2022 0.78 0.00 - 4.00 ug/L Final     Reviewed orders with patient. Reviewed health maintenance and updated orders accordingly - Yes  Lab work is in process    Reviewed and updated as needed this visit by clinical staff   Tobacco  Allergies  Meds  Problems  Med Hx  Surg Hx  Fam Hx  Soc   Hx        Reviewed and updated as needed this visit by Provider   Tobacco  Allergies  Meds  Problems  Med Hx  Surg Hx  Fam Hx         Social history reviewed.  Past Medical History:   Diagnosis Date     Hyperlipidemia LDL goal <100      Mild persistent asthma      Morbid obesity (H)      CAMILO on CPAP      Prediabetes      Past Surgical History:   Procedure Laterality Date     ABDOMEN SURGERY  hernia (2012)     COLONOSCOPY  2018     HERNIA REPAIR  2014       Family History   Problem Relation Age of Onset     Hypertension Mother      Heart Disease Father      Cerebrovascular Disease Father      Diabetes Paternal Grandfather      Prostate Cancer No family hx of      Colon Cancer No family hx of      Ulcerative Colitis No family hx of      Crohn's Disease No family hx of      Thyroid Disease No family hx of      Review of Systems   Constitutional: Negative for chills and fever.   HENT: Negative for congestion, ear pain, hearing loss and sore throat.    Eyes: Negative for pain and visual disturbance.   Respiratory: Negative for cough and shortness of breath.    Cardiovascular: Negative for chest pain, palpitations and peripheral edema.   Gastrointestinal: Negative for abdominal pain, constipation, diarrhea, heartburn, hematochezia and nausea.   Endocrine: Negative for polyuria.   Genitourinary: Positive for frequency and urgency. Negative for difficulty urinating, dysuria, flank pain, genital sores,  "hematuria, impotence and penile discharge.   Musculoskeletal: Positive for arthralgias and joint swelling. Negative for myalgias.   Skin: Negative for rash.   Neurological: Negative for dizziness, weakness, headaches and paresthesias.   Psychiatric/Behavioral: Negative for mood changes. The patient is not nervous/anxious.      OBJECTIVE:   /72   Pulse 68   Temp 98.5  F (36.9  C) (Temporal)   Resp 12   Ht 1.791 m (5' 10.5\")   Wt 133.8 kg (295 lb)   SpO2 96%   BMI 41.73 kg/m      Physical Exam  GENERAL: Obese male.  Healthy, alert and no distress  EYES: Eyes grossly normal to inspection, PERRL and conjunctivae and sclerae normal  HENT: ear canals and TM's normal, nose and mouth without ulcers or lesions  NECK: no adenopathy, no asymmetry, masses, or scars and thyroid normal to palpation  RESP: lungs clear to auscultation - no rales, rhonchi or wheezes  CV: regular rate and rhythm, normal S1 S2, no S3 or S4, no murmur, click or rub, no peripheral edema and peripheral pulses strong  ABDOMEN: soft, nontender, no hepatosplenomegaly, no masses and bowel sounds normal  MS: no gross musculoskeletal defects noted, no edema  SKIN: no suspicious lesions or rashes  NEURO: Normal strength and tone, mentation intact and speech normal  PSYCH: mentation appears normal, affect normal/bright  LYMPH: no cervical or supraclavicular    Labs in process.     ASSESSMENT/PLAN:   1. Routine general medical examination at a health care facility: patient is a 52 year-old male presenting for a routine annual physical. Chronic health conditions, stability, and plan outlined below.   - REVIEW OF HEALTH MAINTENANCE PROTOCOL ORDERS  - Lipid panel reflex to direct LDL Non-fasting; Future  - Hemoglobin A1c; Future  - Basic metabolic panel  (Ca, Cl, CO2, Creat, Gluc, K, Na, BUN); Future    2. Hyperlipidemia LDL goal <70: hyperlipidemia, currently on 40 mg Lipitor. Routine lipid panel, results pending.   - Lipid panel reflex to direct LDL " Non-fasting; Future  - atorvastatin (LIPITOR) 40 MG tablet; Take 1 tablet (40 mg) by mouth daily  Dispense: 90 tablet; Refill: 3    3. Mild persistent asthma without complication: controlled mild persistent asthma with triggers of environmental allergens.Taking Symbicort daily and Albuterol inhaler as needed. Asthma Control Test and Asthma Action Plan completed today.    - budesonide-formoterol (SYMBICORT) 160-4.5 MCG/ACT Inhaler; Inhale 2 puffs into the lungs 2 times daily  Dispense: 30 g; Refill: 1    4. Prediabetes: last A1c on 3/12/22 was elevated at 6.4 indicated prediabetes. Will evaluated A1c today, results pending.   - Hemoglobin A1c; Future  - Basic metabolic panel  (Ca, Cl, CO2, Creat, Gluc, K, Na, BUN); Future    5. CAMILO on CPAP: history of CAMILO, uses CPAP nightly. Stable with no concerns.     6. Morbid obesity with BMI of 45.0-49.9, adult (H): discussed diet and exercise weight management strategies with patient.     7. Lower urinary tract symptoms (LUTS): continued progression in urinary frequency, decreased stream, and incomplete voiding. No concerns for acute UTI. Will start Flomax daily for the above symptoms.  On [employer] message in 1 month if effective.  - tamsulosin (FLOMAX) 0.4 MG capsule; Take 1 capsule (0.4 mg) by mouth daily  Dispense: 30 capsule; Refill: 1    8. Special screening for malignant neoplasms, colon: previous colonoscopy completed on 8/2/2018 with [facility].     9. Screening for prostate cancer: discussed means of screening for prostate cancer. Patient endorses desire to continue completing PSA screening. Results pending.   - PSA, screen; Future    10. Need for immunization against influenza: influenza vaccine administered during encounter.   - INFLUENZA QUAD, RECOMBINANT, P-FREE (RIV4) (FLUBLOK) AGE 50-64 [GNA250]    11. Need for COVID-19 vaccine: COVID-19 booster administered during encounter.     12. High priority for 2019-nCoV vaccine: COVID-19 booster administered during  "encounter.   - COVID-19,PF,PFIZER BOOSTER BIVALENT 12+Yrs    Patient has been advised of split billing requirements and indicates understanding: Yes    COUNSELING:   Reviewed preventive health counseling, as reflected in patient instructions  Special attention given to:        Regular exercise       Healthy diet/nutrition       Vision screening       Immunizations    Vaccinated for: Influenza and COVID-19       Colorectal cancer screening       Prostate cancer screening    Estimated body mass index is 41.73 kg/m  as calculated from the following:    Height as of this encounter: 1.791 m (5' 10.5\").    Weight as of this encounter: 133.8 kg (295 lb).     Weight management plan: Discussed healthy diet and exercise guidelines    He reports that he has never smoked. He has never used smokeless tobacco.    Counseling Resources:  ATP IV Guidelines  Pooled Cohorts Equation Calculator  FRAX Risk Assessment  ICSI Preventive Guidelines  Dietary Guidelines for Americans, 2010  USDA's MyPlate  ASA Prophylaxis  Lung CA Screening    Bo Mendez MD  [facility]     This chart is completed utilizing dictation software; typos and/or incorrect word substitutions may unintentionally occur.    "

## 2022-10-03 NOTE — LETTER
My Asthma Action Plan    Name: Abraham Thibodeaux   YOB: 1970  Date: 10/3/2022   My doctor: Bo Mendez MD   My clinic: [facility]        My Control Medicine: Budesonide + formoterol (Symbicort HFA) -  160/4.5 mcg 2 puffs twice daily  My Rescue Medicine: Albuterol (Proair/Ventolin/Proventil HFA) 2-4 puffs EVERY 4 HOURS as needed. Use a spacer if recommended by your provider.   My Asthma Severity:   Mild Persistent  Know your asthma triggers:   cats, exercise induced-overly exerted            GREEN ZONE   Good Control    I feel good    No cough or wheeze    Can work, sleep and play without asthma symptoms       Take your asthma control medicine every day.     1. If exercise triggers your asthma, take your rescue medication    15 minutes before exercise or sports, and    During exercise if you have asthma symptoms  2. Spacer to use with inhaler: If you have a spacer, make sure to use it with your inhaler             YELLOW ZONE Getting Worse  I have ANY of these:    I do not feel good    Cough or wheeze    Chest feels tight    Wake up at night   1. Keep taking your Green Zone medications  2. Start taking your rescue medicine:    every 20 minutes for up to 1 hour. Then every 4 hours for 24-48 hours.  3. If you stay in the Yellow Zone for more than 12-24 hours, contact your doctor.  4. If you do not return to the Green Zone in 12-24 hours or you get worse, start taking your oral steroid medicine if prescribed by your provider.           RED ZONE Medical Alert - Get Help  I have ANY of these:    I feel awful    Medicine is not helping    Breathing getting harder    Trouble walking or talking    Nose opens wide to breathe       1. Take your rescue medicine NOW  2. If your provider has prescribed an oral steroid medicine, start taking it NOW  3. Call your doctor NOW  4. If you are still in the Red Zone after 20 minutes and you have not reached your doctor:    Take your rescue  medicine again and    Call 911 or go to the emergency room right away    See your regular doctor within 2 weeks of an Emergency Room or Urgent Care visit for follow-up treatment.          Annual Reminders:  Meet with Asthma Educator,  Flu Shot in the Fall, consider Pneumonia Vaccination for patients with asthma (aged 19 and older).    Pharmacy: [facility] [id number] [place] [address]    Electronically signed by Bo Mendez MD   Date: 10/03/22                      Asthma Triggers  How To Control Things That Make Your Asthma Worse    Triggers are things that make your asthma worse.  Look at the list below to help you find your triggers and what you can do about them.  You can help prevent asthma flare-ups by staying away from your triggers.      Trigger                                                          What you can do   Cigarette Smoke  Tobacco smoke can make asthma worse. Do not allow smoking in your home, car or around you.  Be sure no one smokes at a child s day care or school.  If you smoke, ask your health care provider for ways to help you quit.  Ask family members to quit too.  Ask your health care provider for a referral to Quit Plan to help you quit smoking, or call 6-322-693-PLAN.     Colds, Flu, Bronchitis  These are common triggers of asthma. Wash your hands often.  Don t touch your eyes, nose or mouth.  Get a flu shot every year.     Dust Mites  These are tiny bugs that live in cloth or carpet. They are too small to see. Wash sheets and blankets in hot water every week.   Encase pillows and mattress in dust mite proof covers.  Avoid having carpet if you can. If you have carpet, vacuum weekly.   Use a dust mask and HEPA vacuum.   Pollen and Outdoor Mold  Some people are allergic to trees, grass, or weed pollen, or molds. Try to keep your windows closed.  Limit time out doors when pollen count is high.   Ask you health care provider about taking medicine during allergy  season.     Animal Dander  Some people are allergic to skin flakes, urine or saliva from pets with fur or feathers. Keep pets with fur or feathers out of your home.    If you can t keep the pet outdoors, then keep the pet out of your bedroom.  Keep the bedroom door closed.  Keep pets off cloth furniture and away from stuffed toys.     Mice, Rats, and Cockroaches   Some people are allergic to the waste from these pests.   Cover food and garbage.  Clean up spills and food crumbs.  Store grease in the refrigerator.   Keep food out of the bedroom.   Indoor Mold  This can be a trigger if your home has high moisture. Fix leaking faucets, pipes, or other sources of water.   Clean moldy surfaces.  Dehumidify basement if it is damp and smelly.   Smoke, Strong Odors, and Sprays  These can reduce air quality. Stay away from strong odors and sprays, such as perfume, powder, hair spray, paints, smoke incense, paint, cleaning products, candles and new carpet.   Exercise or Sports  Some people with asthma have this trigger. Be active!  Ask your doctor about taking medicine before sports or exercise to prevent symptoms.    Warm up for 5-10 minutes before and after sports or exercise.     Other Triggers of Asthma  Cold air:  Cover your nose and mouth with a scarf.  Sometimes laughing or crying can be a trigger.  Some medicines and food can trigger asthma.

## 2022-10-03 NOTE — RESULT ENCOUNTER NOTE
"Please inform of results if patient has not viewed in [employer] within 3 business days.    PSA - Your Prostate cancer screening lab results were normal.    Lipids - Your \"bad\" cholesterol lab results were normal. Your \"good\" cholesterol, or HDL cholesterol was low. This can be improved with exercise.    BMP - Your blood sugar was 119, which is slightly high in the pre-diabetes range. Your kidney function and electrolytes were normal.    Please call the clinic with any questions you may have.     Have a great day,    Dr. Pantoja"

## 2022-10-03 NOTE — RESULT ENCOUNTER NOTE
Please inform of results if patient has not viewed in [employer] within 3 business days.    A1c - Your 3 month blood sugar average was slightly high at 6.3. This is indicative of pre-diabetes. We should recheck this every year to monitor for progression to diabetes. Losing weight, diet, and exercise can help reduce your risk. This is stable compared to your prior years.    Your other labs are pending.      Please call the clinic with any questions you may have.     Have a great day,    Dr. Pantoja

## 2022-10-31 DIAGNOSIS — R39.9 LOWER URINARY TRACT SYMPTOMS (LUTS): ICD-10-CM

## 2022-11-03 RX ORDER — TAMSULOSIN HYDROCHLORIDE 0.4 MG/1
0.4 CAPSULE ORAL DAILY
Qty: 30 CAPSULE | Refills: 1 | OUTPATIENT
Start: 2022-11-03

## 2022-11-06 ENCOUNTER — MYC MEDICAL ADVICE (OUTPATIENT)
Dept: FAMILY MEDICINE | Facility: CLINIC | Age: 52
End: 2022-11-06

## 2022-11-06 DIAGNOSIS — R39.9 LOWER URINARY TRACT SYMPTOMS (LUTS): ICD-10-CM

## 2022-12-14 DIAGNOSIS — R39.9 LOWER URINARY TRACT SYMPTOMS (LUTS): ICD-10-CM

## 2022-12-15 RX ORDER — TAMSULOSIN HYDROCHLORIDE 0.4 MG/1
0.4 CAPSULE ORAL DAILY
Qty: 30 CAPSULE | Refills: 4 | Status: SHIPPED | OUTPATIENT
Start: 2022-12-15 | End: 2023-03-01

## 2023-02-27 DIAGNOSIS — R39.9 LOWER URINARY TRACT SYMPTOMS (LUTS): ICD-10-CM

## 2023-03-01 RX ORDER — TAMSULOSIN HYDROCHLORIDE 0.4 MG/1
0.4 CAPSULE ORAL DAILY
Qty: 30 CAPSULE | Refills: 5 | Status: SHIPPED | OUTPATIENT
Start: 2023-03-01 | End: 2023-09-13

## 2023-06-27 DIAGNOSIS — R39.9 LOWER URINARY TRACT SYMPTOMS (LUTS): ICD-10-CM

## 2023-06-27 RX ORDER — TAMSULOSIN HYDROCHLORIDE 0.4 MG/1
0.4 CAPSULE ORAL DAILY
Qty: 30 CAPSULE | Refills: 5 | OUTPATIENT
Start: 2023-06-27

## 2023-06-27 NOTE — TELEPHONE ENCOUNTER
Was sent on 3/1/23 with refills, should not be out at this time, please check profile for any held scripts.
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
## 2023-08-21 ENCOUNTER — ALLIED HEALTH/NURSE VISIT (OUTPATIENT)
Dept: SURGERY | Facility: CLINIC | Age: 53
End: 2023-08-21
Payer: COMMERCIAL

## 2023-08-21 ENCOUNTER — OFFICE VISIT (OUTPATIENT)
Dept: SURGERY | Facility: CLINIC | Age: 53
End: 2023-08-21
Payer: COMMERCIAL

## 2023-08-21 VITALS
SYSTOLIC BLOOD PRESSURE: 139 MMHG | WEIGHT: 288 LBS | HEART RATE: 84 BPM | OXYGEN SATURATION: 94 % | DIASTOLIC BLOOD PRESSURE: 84 MMHG | BODY MASS INDEX: 40.32 KG/M2 | HEIGHT: 71 IN

## 2023-08-21 DIAGNOSIS — E66.01 MORBID OBESITY WITH BMI OF 40.0-44.9, ADULT (H): Primary | ICD-10-CM

## 2023-08-21 DIAGNOSIS — E78.5 HYPERLIPIDEMIA LDL GOAL <70: ICD-10-CM

## 2023-08-21 DIAGNOSIS — R73.03 PREDIABETES: ICD-10-CM

## 2023-08-21 PROCEDURE — 99215 OFFICE O/P EST HI 40 MIN: CPT | Performed by: PHYSICIAN ASSISTANT

## 2023-08-21 PROCEDURE — 97802 MEDICAL NUTRITION INDIV IN: CPT

## 2023-08-21 RX ORDER — SEMAGLUTIDE 0.5 MG/.5ML
0.5 INJECTION, SOLUTION SUBCUTANEOUS WEEKLY
Qty: 2 ML | Refills: 1 | Status: SHIPPED | OUTPATIENT
Start: 2023-08-21 | End: 2023-11-09

## 2023-08-21 RX ORDER — SEMAGLUTIDE 0.25 MG/.5ML
0.25 INJECTION, SOLUTION SUBCUTANEOUS WEEKLY
Qty: 2 ML | Refills: 0 | Status: SHIPPED | OUTPATIENT
Start: 2023-08-21 | End: 2023-11-09

## 2023-08-21 RX ORDER — SEMAGLUTIDE 1 MG/.5ML
1 INJECTION, SOLUTION SUBCUTANEOUS WEEKLY
Qty: 2 ML | Refills: 1 | Status: SHIPPED | OUTPATIENT
Start: 2023-08-21 | End: 2023-11-09

## 2023-08-21 NOTE — PATIENT INSTRUCTIONS
"To ensure quality you may receive a patient satisfaction survey. The greatest compliment you can give is \"Likely to Recommend.\"    Nice to talk with you today.  Thank you for your trust. Below is the plan discussed.-  Kavita Díaz PA-C     Plan:  Start Wegovy (semaglutide)   0.25 mg once weekly for 4 weeks,   if tolerating increase to 0.5 mg weekly for 4 weeks,   if tolerating increase to 1 mg weekly    May use famotidine 20 mg twice daily as needed for nausea/heartburn when starting the medication.     2. Labs ordered.  Please call [phone] to set up a lab appt.       Goals:  See dietitian.  64oz water daily  Continue to exercise as your knee allows.    FOLLOW-UP:    Please call [phone] to schedule your next visit in 3 months.     WEGOVY (semaglutide)    Wegovy (semaglutide) injection 2.4 mg is an injectable prescription medicine used for adults with obesity (BMI ?30) or overweight (excess weight) (BMI ?27) who also have weight-related medical problems to help them lose weight and keep the weight off.  It is a GLP-1 agonist medication. GLP1 agonists stimulate the hormone GLP-1 tin your body o allow you to feel full quickly and stay full longer.    Directions:  Start Wegovy (semaglutide) 0.25 mg once weekly for 4 weeks, then if tolerating increase to 0.5 mg weekly for 4 weeks, then if tolerating increase to 1 mg weekly for 4 weeks, then if tolerating increase to 1.7 mg weekly for 4 weeks, then if tolerating increase to 2.4 mg weekly thereafter.    -Each Wegovy pen is a once weekly single-dose prefilled pen with a pen injector already built within the pen. Discard the Wegovy pen after use in sharps container.     Common Side Effects:    nausea, headache, diarrhea, stomach upset.  If these become unmanageable or concerning symptoms, please make sure to call or mychart.    Serious Side effects:   Pancreatitis (inflammation of the pancreas) has been associated with this type of medication, but is very rare.  " If you have had pancreatitis in the past, this medication may not be for you.  Symptoms of pancreatitis include: Pain in your upper stomach area which may travel to your back and be worse after eating.     Storage:   make sure that when you get the prescription that you store the prescription in the refrigerator until it is time to use the Wegovy pen.  Once it is time to use the Wegovy pen, you can keep the pen at room temperature and it is good for up to 28 days at room temperature.     How to inject:  For a video on how to use the pen please go to:  https://www.Divided/about-wegovy/how-to-use-the-wegovy-pen.html#itemTwo     For any questions or concerns please send a [employer] message to our team or call our weight management call center at [phone] during regular business hours. For questions during evenings or weekends your messages will be addressed during the next business day.  For emergencies please call 911 or seek immediate medical care.           There is a small chance you may have some low blood sugar after taking the medication.   The signs of low blood sugar are:  Weakness  Shaky   Hungry  Sweating  Confusion      See below for ways to treat low blood sugar without adding in lots of extra calories.      Treating Low Blood Sugar    If you have symptoms of low blood sugar (sweating, shaking, dizzy, confused) eat 15 grams of carbs and wait 15 minutes:    Glucose Tabs are best for sugars under 70 -  Dex4 or BD Glucose tablets are good, you will need to take 3-4 of these to equal 15 grams.     One small box of raisins  4 oz fruit juice box or   cup fruit juice  1 small apple  1 small banana    cup canned fruit in water    English muffin or a slice of bread with jelly   1 low fat frozen waffle with sugar-free syrup    cup cottage cheese with   cup frozen or fresh blueberries  1 cup skim or low-fat milk    cup whole grain cereal  4-6 crackers such as Triscuits      This medication is usually covered by  insurance for patients with a diagnosis of Type 2 Diabetes. Depending on insurance coverage, the medication may be changed to a different formulary, but they all work in the same way. Sometimes a prior authorization is required, which may take up to 1-2 weeks for an insurance company to make a decision if they will cover the medication. Please be patient, you will be notified either way.     Contact the nurse via [facility] or call [phone] if you have any questions or concerns. (Do not stop taking it if you don't think it's working. For some people it works without them knowing it.)     In order to get refills of this or any medication we prescribe you must be seen in the medical weight mgmt clinic every 2-4 months.

## 2023-08-21 NOTE — PROGRESS NOTES
MEDICAL WEIGHT LOSS INITIAL EVALUATION  DIAGNOSIS:  Obesity Class III    NUTRITION HISTORY:  Diet and exercise history per pre-visit questionnaire as follows:         8/14/2023     8:56 PM   Diet Recall Review with Patient   If you do eat breakfast, what types of food do you eat? cereal or sandwich   If you do eat lunch, what types of food do you typically eat? sandwich, pasta   If you do eat supper, what types of food do you typically eat? pizza, pasta, chicken   If you do snack, what types of food do you typically eat? chips, cookies, pizza   How many glasses of juice do you drink in a typical day? 1   How many of glasses of milk do you drink in a typical day? 1   If you do drink milk, what type? 1%   How many 8oz glasses of sugar containing drinks such as Ric-Aid/sweet tea do you drink in a day? 0   How many cans/bottles of sugar pop/soda/tea/sports drinks do you drink in a day? 1   How many cans/bottles of diet pop/soda/tea or sports drink do you drink in a day? 1   How often do you have a drink of alcohol? Never      Meals     Water             8/14/2023     8:56 PM   Eating Habits   Generally, my meals include foods like these bread, pasta, rice, potatoes, corn, crackers, sweet dessert, pop, or juice Everyday   Generally, my meals include foods like these fried meats, brats, burgers, french fries, pizza, cheese, chips, or ice cream Everyday   Eat fast food (like McDonalds, [employer], Taco Bell) Less Than Weekly   Eat at a buffet or sit-down restaurant A Few Times a Week   Eat most of my meals in front of the TV or computer Less Than Weekly   Often skip meals, eat at random times, have no regular eating times Never   Rarely sit down for a meal but snack or graze throughout Less Than Weekly   Eat extra snacks between meals Almost Everyday   Eat most of my food at the end of the day Less Than Weekly   Eat in the middle of the night or wake up at night to eat Never   Eat extra snacks to prevent or correct low  blood sugar Never   Eat to prevent acid reflux or stomach pain Never   Worry about not having enough food to eat Never   I eat when I am depressed Less Than Weekly   I eat when I am stressed Almost Everyday   I eat when I am bored Almost Everyday   I eat when I am anxious Less Than Weekly   I eat when I am happy or as a reward Almost Everyday   I feel hungry all the time even if I just have eaten Never   Feeling full is important to me Never   I finish all the food on my plate even if I am already full A Few Times a Week   I can't resist eating delicious food or walk past the good food/smell Never   I eat/snack without noticing that I am eating A Few Times a Week   I eat when I am preparing the meal Less Than Weekly   I eat more than usual when I see others eating Less Than Weekly   I have trouble not eating sweets, ice cream, cookies, or chips if they are around the house Everyday   I think about food all day Never   What foods, if any, do you crave? Sweets/Candy/Chocolate   Please list any other foods you crave? pizza,chips cookies              8/14/2023     8:56 PM   Amount of Food   I feel out of control when eating Never   I eat a large amount of food, like a loaf of bread, a box of cookies, a pint/quart of ice cream, all at once Never   I eat a large amount of food even when I am not hungry Never   I eat rapidly Almost Everyday   I eat alone because I feel embarrassed and do not want others to see how much I have eaten Never   I eat until I am uncomfortably full Monthly   I feel bad, disgusted, or guilty after I overeat Monthly      Eating rapidly due to          8/14/2023     8:56 PM   Activity/Exercise History   How much of a typical 12 hour day do you spend sitting? Half the Day   How much of a typical 12 hour day do you spend lying down? Less Than Half the Day   How much of a typical day do you spend walking/standing? Half the Day   How many hours (not including work) do you spend on the TV/Video  "Games/Computer/Tablet/Phone? 2-3 Hours   How many times a week are you active for the purpose of exercise? Once a Week   What keeps you from being more active? Pain    Lack of Time    Too tired   How many total minutes do you spend doing some activity for the purpose of exercising when you exercise? 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 Motivation for weight loss is to model good behaviors for 8 y.o. daughter. Pt prescribed Wegovy by provider. Pt feels he knows what he should be eating for weight loss but struggles with motivation and follow-through.     ANTHROPOMETRICS:  Height: 5' 10.5\"   Weight: 288 lbs 0 oz  BMI:  Body mass index is 40.74 kg/m .     NUTRITION DIAGNOSIS:   Obese class III related to overeating and poor lifestyle habits as evidence by patient's subjective statements and  BMI of 40.74 kg/m2   NUTRITION INTERVENTIONS  Nutrition Prescription:  Recommend modified energy- nutrient intake  Implementation:  Nutrition Education (Content):  Discussed portion sizes and plate method  Provided: Tips for Weight Loss and Weight Management, Protein Sources for Weight Loss, Fiber Content in Food, Plate Method    Nutrition Education (Application):   Patient to practice goals as stated below  Patient verbalizes understanding of diet by stating he will eat meals at regular intervals.  Anticipate fair-good compliance    Goals:  Eat breakfast daily  Eat a meal every 4-6 hours  Be mindful of snacking choices      FOLLOW UP AND MONITORING:    Other  - follow up in 4-8 weeks.     TIME SPENT WITH PATIENT:   30 minutes        Cassidy Pennington RD, LD  Clinical Dietitian   "

## 2023-08-21 NOTE — PATIENT INSTRUCTIONS
"[place]!      It was great meeting with you today! Here are some links to the handouts I referenced:        Protein Sources:  [email or web address]     Fiber Sources:  [email or web address]     My Plate:  https://www.Check-Capmyplate.gov/        A helpful search term to type into [employer], [employer], etc is \"myplate examples.\" [Link: MyPlate examples Google Search ]     Key points from today:  Eat 3 meals/day at consistent intervals  Shoot for 90-135g protein (20-30g/meal)  Aim for 25-35g fiber (5-10g/meal)  Eat slowly: 20-30 minutes per meal     Here is a summary of the goals that we discussed:     1. Eat breakfast daily  - Eat within 1 hour of waking up       2. Eat at regular intervals  - Eat a meal every 4-6 hours     3. Be mindful of snacking habits  - emphasize protein and fiber        Let's plan on following up in 1-2 months. This can be scheduled via our call center at . Of course, reach out sooner if you have any questions or concerns. Have a great week and welcome to the program!        Cassidy Pennington RD, LD  Clinical Dietitian   "

## 2023-08-22 VITALS — WEIGHT: 288 LBS | HEIGHT: 71 IN | BODY MASS INDEX: 40.32 KG/M2

## 2023-08-31 ENCOUNTER — TELEPHONE (OUTPATIENT)
Dept: SURGERY | Facility: CLINIC | Age: 53
End: 2023-08-31
Payer: COMMERCIAL

## 2023-08-31 NOTE — TELEPHONE ENCOUNTER
Prior Authorization Retail Medication Request    Medication/Dose: PA:  Semaglutide-Weight Management (WEGOVY) 0.25 MG/0.5ML pen 2 mL 0 8/21/2023  --  Sig - Route: Inject 0.25 mg Subcutaneous once a week For 4 weeks - Subcutaneous  Semaglutide-Weight Management (WEGOVY) 0.5 MG/0.5ML pen 2 mL 1 8/21/2023  --  Sig - Route: Inject 0.5 mg Subcutaneous once a week - Subcutaneous  Semaglutide-Weight Management (WEGOVY) 1 MG/0.5ML pen 2 mL 1 8/21/2023  --  Sig - Route: Inject 1 mg Subcutaneous once a week - Subcutaneous    ICD code (if different than what is on RX):  [E66.01, Z68.41]   Previously Tried and Failed:  Watching Portions or Calories    Exercise    Fasting  Rationale:  Weight management    Insurance Name:      [employer] ACCESS   Insurance ID:  [id number]     Pharmacy Information (if different than what is on RX)  Name:    [facility] [id number] [place] [facility] [id number] [place] [address]     Phone:  [phone]

## 2023-09-05 NOTE — TELEPHONE ENCOUNTER
Central Prior Authorization Team - Phone: [phone]     PA Initiation    Medication: WEGOVY 0.25 MG/0.5ML SC SOAJ  Insurance Company: [employer] - Phone [phone] Fax [phone]  Pharmacy Filling the Rx: [id number] [place], [place] - [address]  Filling Pharmacy Phone: [phone]  Filling Pharmacy Fax:    Start Date: 9/5/2023

## 2023-09-06 NOTE — TELEPHONE ENCOUNTER
Central Prior Authorization Team - Phone: [phone]     Prior Authorization Approval    Medication: WEGOVY 0.25 MG/0.5ML SC SOAJ  Authorization Effective Date: 8/6/2023  Authorization Expiration Date: 9/4/2024  Approved Dose/Quantity: 2  Reference #:     Insurance Company: [employer] - Phone [phone] Fax [phone]  Expected CoPay:       CoPay Card Available:      Financial Assistance Needed:   Which Pharmacy is filling the prescription: [facility] [id number] [place] [address]  Pharmacy Notified: Yes  Patient Notified: Yes pharmacy will notify when ready

## 2023-09-13 DIAGNOSIS — R39.9 LOWER URINARY TRACT SYMPTOMS (LUTS): ICD-10-CM

## 2023-09-13 RX ORDER — TAMSULOSIN HYDROCHLORIDE 0.4 MG/1
0.4 CAPSULE ORAL DAILY
Qty: 90 CAPSULE | Refills: 0 | Status: SHIPPED | OUTPATIENT
Start: 2023-09-13 | End: 2023-09-29

## 2023-09-29 ENCOUNTER — TELEPHONE (OUTPATIENT)
Dept: FAMILY MEDICINE | Facility: CLINIC | Age: 53
End: 2023-09-29
Payer: COMMERCIAL

## 2023-09-29 DIAGNOSIS — R39.9 LOWER URINARY TRACT SYMPTOMS (LUTS): ICD-10-CM

## 2023-09-29 RX ORDER — TAMSULOSIN HYDROCHLORIDE 0.4 MG/1
0.4 CAPSULE ORAL DAILY
Qty: 90 CAPSULE | Refills: 3 | Status: SHIPPED | OUTPATIENT
Start: 2023-09-29 | End: 2024-09-26

## 2023-09-29 NOTE — TELEPHONE ENCOUNTER
Let patient know I am out ill and I apologize for having to cancel. I'm glad to hear his medication is working well.     He should still have a physical and labs for his lipitor and inhalers etc.    His flomax has been refilled.    Thank you,    Dr. Pantoja

## 2023-09-29 NOTE — TELEPHONE ENCOUNTER
Reason for Call:  Appointment-visit today was canceled due to provider being out.    Patient requesting this type of appt:  office visit or virtual visit    Requested provider: Bo Mendez    Reason patient unable to be scheduled:  patient is wondering if the visit he was supposed to have this morning for a follow up on the new medication is needed and if so will it need to be in person or virtual? Patient states new medication is going great.      Could we send this information to you in [employer] or would you prefer to receive a phone call?:   Patient would prefer a phone call   Okay to leave a detailed message?: Yes at Home number on file [phone] (home)    Call taken on 9/29/2023 at 6:48 AM by Tequila Rousseau CMA

## 2023-11-09 ENCOUNTER — OFFICE VISIT (OUTPATIENT)
Dept: FAMILY MEDICINE | Facility: CLINIC | Age: 53
End: 2023-11-09
Attending: FAMILY MEDICINE
Payer: COMMERCIAL

## 2023-11-09 VITALS
DIASTOLIC BLOOD PRESSURE: 74 MMHG | RESPIRATION RATE: 20 BRPM | HEIGHT: 70 IN | OXYGEN SATURATION: 94 % | WEIGHT: 289 LBS | TEMPERATURE: 98.9 F | HEART RATE: 76 BPM | BODY MASS INDEX: 41.37 KG/M2 | SYSTOLIC BLOOD PRESSURE: 122 MMHG

## 2023-11-09 DIAGNOSIS — R73.03 PREDIABETES: ICD-10-CM

## 2023-11-09 DIAGNOSIS — Z23 NEED FOR PROPHYLACTIC VACCINATION AND INOCULATION AGAINST INFLUENZA: ICD-10-CM

## 2023-11-09 DIAGNOSIS — E78.5 HYPERLIPIDEMIA LDL GOAL <70: ICD-10-CM

## 2023-11-09 DIAGNOSIS — Z12.5 SCREENING FOR PROSTATE CANCER: ICD-10-CM

## 2023-11-09 DIAGNOSIS — Z00.00 ROUTINE GENERAL MEDICAL EXAMINATION AT A HEALTH CARE FACILITY: Primary | ICD-10-CM

## 2023-11-09 DIAGNOSIS — Z23 NEED FOR VACCINATION FOR PNEUMOCOCCUS: ICD-10-CM

## 2023-11-09 DIAGNOSIS — J45.30 MILD PERSISTENT ASTHMA WITHOUT COMPLICATION: ICD-10-CM

## 2023-11-09 DIAGNOSIS — E66.01 MORBID OBESITY WITH BMI OF 45.0-49.9, ADULT (H): ICD-10-CM

## 2023-11-09 DIAGNOSIS — Z23 NEED FOR COVID-19 VACCINE: ICD-10-CM

## 2023-11-09 LAB
ERYTHROCYTE [DISTWIDTH] IN BLOOD BY AUTOMATED COUNT: 12.8 % (ref 10–15)
HBA1C MFR BLD: 6.1 % (ref 0–5.6)
HCT VFR BLD AUTO: 45.6 % (ref 40–53)
HGB BLD-MCNC: 15.2 G/DL (ref 13.3–17.7)
MCH RBC QN AUTO: 31.3 PG (ref 26.5–33)
MCHC RBC AUTO-ENTMCNC: 33.3 G/DL (ref 31.5–36.5)
MCV RBC AUTO: 94 FL (ref 78–100)
PLATELET # BLD AUTO: 225 10E3/UL (ref 150–450)
RBC # BLD AUTO: 4.85 10E6/UL (ref 4.4–5.9)
WBC # BLD AUTO: 11.7 10E3/UL (ref 4–11)

## 2023-11-09 PROCEDURE — 83036 HEMOGLOBIN GLYCOSYLATED A1C: CPT | Performed by: FAMILY MEDICINE

## 2023-11-09 PROCEDURE — 99396 PREV VISIT EST AGE 40-64: CPT | Mod: 25 | Performed by: FAMILY MEDICINE

## 2023-11-09 PROCEDURE — 80053 COMPREHEN METABOLIC PANEL: CPT | Performed by: FAMILY MEDICINE

## 2023-11-09 PROCEDURE — 91320 SARSCV2 VAC 30MCG TRS-SUC IM: CPT | Performed by: FAMILY MEDICINE

## 2023-11-09 PROCEDURE — 90686 IIV4 VACC NO PRSV 0.5 ML IM: CPT | Performed by: FAMILY MEDICINE

## 2023-11-09 PROCEDURE — 90472 IMMUNIZATION ADMIN EACH ADD: CPT | Performed by: FAMILY MEDICINE

## 2023-11-09 PROCEDURE — 36415 COLL VENOUS BLD VENIPUNCTURE: CPT | Performed by: FAMILY MEDICINE

## 2023-11-09 PROCEDURE — 80061 LIPID PANEL: CPT | Performed by: FAMILY MEDICINE

## 2023-11-09 PROCEDURE — 90480 ADMN SARSCOV2 VAC 1/ONLY CMP: CPT | Performed by: FAMILY MEDICINE

## 2023-11-09 PROCEDURE — G0103 PSA SCREENING: HCPCS | Performed by: FAMILY MEDICINE

## 2023-11-09 PROCEDURE — 99214 OFFICE O/P EST MOD 30 MIN: CPT | Mod: 25 | Performed by: FAMILY MEDICINE

## 2023-11-09 PROCEDURE — 85025 COMPLETE CBC W/AUTO DIFF WBC: CPT | Performed by: FAMILY MEDICINE

## 2023-11-09 PROCEDURE — 90471 IMMUNIZATION ADMIN: CPT | Performed by: FAMILY MEDICINE

## 2023-11-09 PROCEDURE — 90677 PCV20 VACCINE IM: CPT | Performed by: FAMILY MEDICINE

## 2023-11-09 RX ORDER — ATORVASTATIN CALCIUM 40 MG/1
40 TABLET, FILM COATED ORAL DAILY
Qty: 90 TABLET | Refills: 3 | Status: SHIPPED | OUTPATIENT
Start: 2023-11-09

## 2023-11-09 ASSESSMENT — ENCOUNTER SYMPTOMS
WEAKNESS: 0
DYSURIA: 0
NERVOUS/ANXIOUS: 0
MYALGIAS: 0
HEMATOCHEZIA: 0
PALPITATIONS: 0
ABDOMINAL PAIN: 0
HEMATURIA: 0
DIZZINESS: 0
COUGH: 0
PARESTHESIAS: 0
JOINT SWELLING: 1
FEVER: 0
HEADACHES: 0
CHILLS: 0
FREQUENCY: 1
HEARTBURN: 0
SORE THROAT: 0
ARTHRALGIAS: 1
NAUSEA: 0
EYE PAIN: 0
CONSTIPATION: 0
DIARRHEA: 0
SHORTNESS OF BREATH: 0

## 2023-11-09 ASSESSMENT — PAIN SCALES - GENERAL: PAINLEVEL: NO PAIN (0)

## 2023-11-09 NOTE — PROGRESS NOTES
SUBJECTIVE:   CC: Ammon is an 53 year old who presents for preventative health visit.       11/9/2023     2:32 PM   Additional Questions   Roomed by Ceci ROSE   Accompanied by None         11/9/2023     2:32 PM   Patient Reported Additional Medications   Patient reports taking the following new medications NA     Healthy Habits:     Getting at least 3 servings of Calcium per day:  NO    Bi-annual eye exam:  Yes    Dental care twice a year:  Yes    Sleep apnea or symptoms of sleep apnea:  Sleep apnea    Diet:  Regular (no restrictions)    Frequency of exercise:  2-3 days/week    Duration of exercise:  Less than 15 minutes    Taking medications regularly:  Yes    Medication side effects:  None    Additional concerns today:  No    Not taking wegovy for weight loss. Would like to do this more on his own.     Needs refills of Lipitor. Flomax working well enough. Not wanting to do any further medications for this.    Asthma controlled.    Today's PHQ-2 Score:       11/9/2023     2:33 PM   PHQ-2 ( 1999 [employer])   Q1: Little interest or pleasure in doing things 0   Q2: Feeling down, depressed or hopeless 0   PHQ-2 Score 0   Q1: Little interest or pleasure in doing things Not at all   Q2: Feeling down, depressed or hopeless Not at all   PHQ-2 Score 0     Social History     Tobacco Use    Smoking status: Never    Smokeless tobacco: Never   Substance Use Topics    Alcohol use: No         11/9/2023     2:32 PM   Alcohol Use   Prescreen: >3 drinks/day or >7 drinks/week? No     Last PSA:   PSA   Date Value Ref Range Status   11/13/2019 0.91 0 - 4 ug/L Final     Comment:     Assay Method:  Chemiluminescence using Siemens Vista analyzer     Prostate Specific Antigen Screen   Date Value Ref Range Status   11/09/2023 1.26 0.00 - 3.50 ng/mL Final   10/03/2022 0.94 0.00 - 4.00 ug/L Final     Reviewed orders with patient. Reviewed health maintenance and updated orders accordingly - Yes  Lab work is in process  BP Readings from Last 3  Encounters:   11/09/23 122/74   08/21/23 139/84   10/03/22 124/72    Wt Readings from Last 3 Encounters:   11/09/23 131.1 kg (289 lb)   08/22/23 130.6 kg (288 lb)   08/21/23 130.6 kg (288 lb)                  Patient Active Problem List   Diagnosis    Morbid obesity with BMI of 45.0-49.9, adult (H)    Mild persistent asthma without complication    CAMILO on CPAP    Hyperlipidemia LDL goal <70    Prediabetes    Seasonal allergic rhinitis, unspecified trigger    Agatston coronary artery calcium score less than 100     Past Surgical History:   Procedure Laterality Date    ABDOMEN SURGERY  hernia (2012)    COLONOSCOPY  2018    HERNIA REPAIR  2014       Social History     Tobacco Use    Smoking status: Never    Smokeless tobacco: Never   Substance Use Topics    Alcohol use: No     Family History   Problem Relation Age of Onset    Hypertension Mother     Heart Disease Father     Cerebrovascular Disease Father     Diabetes Paternal Grandfather     Prostate Cancer No family hx of     Colon Cancer No family hx of     Ulcerative Colitis No family hx of     Crohn's Disease No family hx of     Thyroid Disease No family hx of          Current Outpatient Medications   Medication Sig Dispense Refill    atorvastatin (LIPITOR) 40 MG tablet Take 1 tablet (40 mg) by mouth daily 90 tablet 3    budesonide-formoterol (SYMBICORT) 160-4.5 MCG/ACT Inhaler Inhale 2 puffs into the lungs 2 times daily 30 g 1    fluticasone (FLONASE) 50 MCG/ACT nasal spray INSTILL 2 SPRAYS INTO BOTH NOSTRILS DAILY 16 mL 0    RESTASIS MULTIDOSE 0.05 % ophthalmic emulsion   2    tamsulosin (FLOMAX) 0.4 MG capsule Take 1 capsule (0.4 mg) by mouth daily 90 capsule 3    albuterol (PROAIR HFA/PROVENTIL HFA/VENTOLIN HFA) 108 (90 Base) MCG/ACT inhaler Inhale 2 puffs into the lungs every 4 hours as needed for shortness of breath / dyspnea or wheezing (Patient not taking: Reported on 8/21/2023) 2 Inhaler 5     Allergies   Allergen Reactions    Cat Hair Extract Cough,  "Difficulty breathing and Shortness Of Breath     Reviewed and updated as needed this visit by clinical staff   Tobacco  Allergies  Meds  Problems  Med Hx  Surg Hx  Fam Hx        Reviewed and updated as needed this visit by Provider   Tobacco  Allergies  Meds  Problems  Med Hx  Surg Hx  Fam Hx       Social Hx Reviewed   Past Medical History:   Diagnosis Date    Hyperlipidemia LDL goal <100     Mild persistent asthma     Morbid obesity (H)     CAMILO on CPAP     Prediabetes       Past Surgical History:   Procedure Laterality Date    ABDOMEN SURGERY  hernia (2012)    COLONOSCOPY  2018    HERNIA REPAIR  2014     Review of Systems   Constitutional:  Negative for chills and fever.   HENT:  Negative for congestion, ear pain, hearing loss and sore throat.    Eyes:  Negative for pain and visual disturbance.   Respiratory:  Negative for cough and shortness of breath.    Cardiovascular:  Negative for chest pain, palpitations and peripheral edema.   Gastrointestinal:  Negative for abdominal pain, constipation, diarrhea, heartburn, hematochezia and nausea.   Genitourinary:  Positive for frequency and impotence. Negative for dysuria, genital sores, hematuria, penile discharge and urgency.   Musculoskeletal:  Positive for arthralgias and joint swelling. Negative for myalgias.   Skin:  Negative for rash.   Neurological:  Negative for dizziness, weakness, headaches and paresthesias.   Psychiatric/Behavioral:  Negative for mood changes. The patient is not nervous/anxious.      OBJECTIVE:   /74 (BP Location: Left arm, Patient Position: Chair, Cuff Size: Adult Regular)   Pulse 76   Temp 98.9  F (37.2  C) (Temporal)   Resp 20   Ht 1.771 m (5' 9.72\")   Wt 131.1 kg (289 lb)   SpO2 94%   BMI 41.80 kg/m      Physical Exam  GENERAL: Obese male. Healthy, alert and no distress  EYES: Eyes grossly normal to inspection, PERRL and conjunctivae and sclerae normal  HENT: ear canals and TM's normal, nose and mouth without " ulcers or lesions  NECK: no adenopathy, no asymmetry, masses, or scars and thyroid normal to palpation  RESP: lungs clear to auscultation - no rales, rhonchi or wheezes  CV: regular rate and rhythm, normal S1 S2, no S3 or S4, no murmur, click or rub, no peripheral edema and peripheral pulses strong  ABDOMEN: soft, nontender, no hepatosplenomegaly, no masses and bowel sounds normal  MS: no gross musculoskeletal defects noted, no edema  SKIN: no suspicious lesions or rashes  NEURO: Normal strength and tone, mentation intact and speech normal  PSYCH: mentation appears normal, affect normal/bright    Labs: pending    ASSESSMENT/PLAN:   1. Routine general medical examination at a health care facility  Discussed personal health and safety. Routine screenings as below. Appropriate anticipatory guidance, vaccinations, and health screening recommendations delivered according to the USPSTF and other appropriate society guidelines.  Patient understands and is agreeable with the plan ordered below.  - PRIMARY CARE FOLLOW-UP SCHEDULING  - Pneumococcal 20 Valent Conjugate (Prevnar 20)  - COVID-19 12+ (2023-24) (PFIZER)  - REVIEW OF HEALTH MAINTENANCE PROTOCOL ORDERS  - PSA, screen; Future  - Comprehensive metabolic panel (BMP + Alb, Alk Phos, ALT, AST, Total. Bili, TP); Future  - CBC with platelets; Future  - Hemoglobin A1c; Future  - PSA, screen  - Comprehensive metabolic panel (BMP + Alb, Alk Phos, ALT, AST, Total. Bili, TP)  - CBC with platelets  - Hemoglobin A1c  - WBC with Diff; Future  - WBC with Diff  - VACCINE ADMINISTRATION, INITIAL  - IMMUNIATION ADMIN EACH ADDT'  - IMMUNIATION ADMIN EACH ADDT'  - PRIMARY CARE FOLLOW-UP SCHEDULING; Future    2. Hyperlipidemia LDL goal <70  Check labs to monitor efficacy of interventions (medication, lifestyle, etc). No side effects reported. Continue on current therapy. See medication list for more details. Ok for refill of current lipid lowering medications for next 1 year. Will need  follow-up visit with labs in 1 year.  - Lipid panel reflex to direct LDL Non-fasting; Future  - Comprehensive metabolic panel (BMP + Alb, Alk Phos, ALT, AST, Total. Bili, TP); Future  - atorvastatin (LIPITOR) 40 MG tablet; Take 1 tablet (40 mg) by mouth daily  Dispense: 90 tablet; Refill: 3  - Lipid panel reflex to direct LDL Non-fasting  - Comprehensive metabolic panel (BMP + Alb, Alk Phos, ALT, AST, Total. Bili, TP)    3. Mild persistent asthma without complication  Controlled. AAP written. See ACT. Does not need refills today. Follow-up with ACT via mychart in 6 months.    4. Prediabetes  Monitor with A1c  - Comprehensive metabolic panel (BMP + Alb, Alk Phos, ALT, AST, Total. Bili, TP); Future  - Hemoglobin A1c; Future  - Comprehensive metabolic panel (BMP + Alb, Alk Phos, ALT, AST, Total. Bili, TP)  - Hemoglobin A1c    5. Screening for prostate cancer  Patient elects to undergo PSA screening after informed decision making process. This discussion with the patient included reviewing the benefits of testing such as: Ability to easily add on to existing blood work, screening for a common cancer in men which has treatment options and otherwise may have been silent, and possibility for early detection to prevent morbidity from future metastasis and/or death. Additionally, risks were discussed including possibility of false positive testing (leading to anxiety and further unnecessary testing/biopsies), possibility of over treating a cancer that may not affect a man in his lifetime, and the side effects of the current treatment options for prosate cancer (urinary incontinence, ED, etc).  - PSA, screen; Future  - PSA, screen    6. Morbid obesity with BMI of 45.0-49.9, adult (H)  Noted. Encourage diet and exercise changes for weight loss and overall health improvement. Discussed BMR calculator, reading nutrition labels, being intentional and scheduling exercise, meal prep/shopping tips, discussing biological reasons for  "frequent failure to maintain weight loss, and medication therapy.     7. Need for COVID-19 vaccine  - COVID-19 12+ (2023-24) (PFIZER)  - VACCINE ADMINISTRATION, INITIAL    8. Need for prophylactic vaccination and inoculation against influenza  - INFLUENZA VACCINE IM > 6 MONTHS VALENT IIV4 (AFLURIA/FLUZONE)  - IMMUNIATION ADMIN EACH ADDT'    9. Need for vaccination for pneumococcus  - Pneumococcal 20 Valent Conjugate (Prevnar 20)  - IMMUNIATION ADMIN EACH ADDT'    Patient has been advised of split billing requirements and indicates understanding: Yes      COUNSELING:   Reviewed preventive health counseling, as reflected in patient instructions       Regular exercise       Healthy diet/nutrition       Vision screening       Hearing screening       Colorectal cancer screening       Prostate cancer screening    BMI:   Estimated body mass index is 41.8 kg/m  as calculated from the following:    Height as of this encounter: 1.771 m (5' 9.72\").    Weight as of this encounter: 131.1 kg (289 lb).   Weight management plan: Discussed healthy diet and exercise guidelines    He reports that he has never smoked. He has never used smokeless tobacco.    Bo Mendez MD  [facility]    Disclaimer: This note consists of symbols derived from keyboarding, dictation and/or voice recognition software. As a result, there may be errors in the script that have gone undetected. Please consider this when interpreting information found in this chart.  "

## 2023-11-09 NOTE — RESULT ENCOUNTER NOTE
Please inform of results if patient has not viewed in [employer] within 3 business days.    A1c - Your 3 month blood sugar average was slightly high at 6.1. This is indicative of pre-diabetes. We should recheck this every year to monitor for progression to diabetes. Losing weight, diet, and exercise can help reduce your risk.    Please call the clinic with any questions you may have.     Have a great day,    Dr. Pantoja

## 2023-11-09 NOTE — PATIENT INSTRUCTIONS
"You can take 1000 mg of tylenol up to three times daily, as long as another provider has not restricted you from taking this type of medication.    You can take 400-600 mg of Ibuprofen up to three times daily with food, as long as another provider has restricted you from taking this type of medication.    You can also try over the counter Aspercreme (lidocaine), Biofreeze (menthol), or Voltaren (diclofenac) for pain.    \"Things that are less studied/variable evidence - glucosamine (evidence in animals, but not humans). Tumeric - natural anti-inflammatory.\"        Preventive Health Recommendations  Male Ages 50 - 64    Yearly exam:             See your health care provider every year in order to  o   Review health changes.   o   Discuss preventive care.    o   Review your medicines if your doctor has prescribed any.   Have a cholesterol test every 5 years, or more frequently if you are at risk for high cholesterol/heart disease.   Have a diabetes test (fasting glucose) every three years. If you are at risk for diabetes, you should have this test more often.   Have a colonoscopy at age 45, or have a yearly FIT test (stool test). These exams will check for colon cancer.    Talk with your health care provider about whether or not a prostate cancer screening test (PSA) is right for you.  You should be tested each year for STDs (sexually transmitted diseases), if you re at risk.     Shots: Get a flu shot each year. Get a tetanus shot every 10 years.     Nutrition:  Eat at least 5 servings of fruits and vegetables daily.   Eat whole-grain bread, whole-wheat pasta and brown rice instead of white grains and rice.   Get adequate Calcium and Vitamin D.     Lifestyle  Exercise for at least 150 minutes a week (30 minutes a day, 5 days a week). This will help you control your weight and prevent disease.   Limit alcohol to one drink per day.   No smoking.   Wear sunscreen to prevent skin cancer.   See your dentist every six months " for an exam and cleaning.   See your eye doctor every 1 to 2 years.

## 2023-11-09 NOTE — LETTER
My Asthma Action Plan    Name: Abraham Thibodeaux   YOB: 1970  Date: 11/13/2023   My doctor: Bo Mendez MD   My clinic: [facility]        My Control Medicine: Budesonide + formoterol (Symbicort HFA) -  160/4.5 mcg 2 puffs twice daily  My Rescue Medicine: Albuterol (Proair/Ventolin/Proventil HFA) 2-4 puffs EVERY 4 HOURS as needed. Use a spacer if recommended by your provider. My Asthma Severity:   Moderate Persistent  Know your asthma triggers:   cats, exercise induced-overly exerted            GREEN ZONE   Good Control  I feel good  No cough or wheeze  Can work, sleep and play without asthma symptoms       Take your asthma control medicine every day.     If exercise triggers your asthma, take your rescue medication  15 minutes before exercise or sports, and  During exercise if you have asthma symptoms  Spacer to use with inhaler: If you have a spacer, make sure to use it with your inhaler             YELLOW ZONE Getting Worse  I have ANY of these:  I do not feel good  Cough or wheeze  Chest feels tight  Wake up at night   Keep taking your Green Zone medications  Start taking your rescue medicine:  every 20 minutes for up to 1 hour. Then every 4 hours for 24-48 hours.  If you stay in the Yellow Zone for more than 12-24 hours, contact your doctor.  If you do not return to the Green Zone in 12-24 hours or you get worse, start taking your oral steroid medicine if prescribed by your provider.           RED ZONE Medical Alert - Get Help  I have ANY of these:  I feel awful  Medicine is not helping  Breathing getting harder  Trouble walking or talking  Nose opens wide to breathe       Take your rescue medicine NOW  If your provider has prescribed an oral steroid medicine, start taking it NOW  Call your doctor NOW  If you are still in the Red Zone after 20 minutes and you have not reached your doctor:  Take your rescue medicine again and  Call 911 or go to the emergency room  right away    See your regular doctor within 2 weeks of an Emergency Room or Urgent Care visit for follow-up treatment.          Annual Reminders:  Meet with Asthma Educator,  Flu Shot in the Fall, consider Pneumonia Vaccination for patients with asthma (aged 19 and older).    Pharmacy: [facility] [id number] [place] [address]    Electronically signed by Bo Mendez MD   Date: 11/13/23                      Asthma Triggers  How To Control Things That Make Your Asthma Worse    Triggers are things that make your asthma worse.  Look at the list below to help you find your triggers and what you can do about them.  You can help prevent asthma flare-ups by staying away from your triggers.      Trigger                                                          What you can do   Cigarette Smoke  Tobacco smoke can make asthma worse. Do not allow smoking in your home, car or around you.  Be sure no one smokes at a child s day care or school.  If you smoke, ask your health care provider for ways to help you quit.  Ask family members to quit too.  Ask your health care provider for a referral to Quit Plan to help you quit smoking, or call 3-719-216-PLAN.     Colds, Flu, Bronchitis  These are common triggers of asthma. Wash your hands often.  Don t touch your eyes, nose or mouth.  Get a flu shot every year.     Dust Mites  These are tiny bugs that live in cloth or carpet. They are too small to see. Wash sheets and blankets in hot water every week.   Encase pillows and mattress in dust mite proof covers.  Avoid having carpet if you can. If you have carpet, vacuum weekly.   Use a dust mask and HEPA vacuum.   Pollen and Outdoor Mold  Some people are allergic to trees, grass, or weed pollen, or molds. Try to keep your windows closed.  Limit time out doors when pollen count is high.   Ask you health care provider about taking medicine during allergy season.     Animal Dander  Some people are allergic to skin  flakes, urine or saliva from pets with fur or feathers. Keep pets with fur or feathers out of your home.    If you can t keep the pet outdoors, then keep the pet out of your bedroom.  Keep the bedroom door closed.  Keep pets off cloth furniture and away from stuffed toys.     Mice, Rats, and Cockroaches   Some people are allergic to the waste from these pests.   Cover food and garbage.  Clean up spills and food crumbs.  Store grease in the refrigerator.   Keep food out of the bedroom.   Indoor Mold  This can be a trigger if your home has high moisture. Fix leaking faucets, pipes, or other sources of water.   Clean moldy surfaces.  Dehumidify basement if it is damp and smelly.   Smoke, Strong Odors, and Sprays  These can reduce air quality. Stay away from strong odors and sprays, such as perfume, powder, hair spray, paints, smoke incense, paint, cleaning products, candles and new carpet.   Exercise or Sports  Some people with asthma have this trigger. Be active!  Ask your doctor about taking medicine before sports or exercise to prevent symptoms.    Warm up for 5-10 minutes before and after sports or exercise.     Other Triggers of Asthma  Cold air:  Cover your nose and mouth with a scarf.  Sometimes laughing or crying can be a trigger.  Some medicines and food can trigger asthma.

## 2023-11-10 LAB
ALBUMIN SERPL BCG-MCNC: 4.1 G/DL (ref 3.5–5.2)
ALP SERPL-CCNC: 77 U/L (ref 40–129)
ALT SERPL W P-5'-P-CCNC: 46 U/L (ref 0–70)
ANION GAP SERPL CALCULATED.3IONS-SCNC: 11 MMOL/L (ref 7–15)
AST SERPL W P-5'-P-CCNC: 41 U/L (ref 0–45)
BASOPHILS # BLD AUTO: 0 10E3/UL (ref 0–0.2)
BASOPHILS NFR BLD AUTO: 0 %
BILIRUB SERPL-MCNC: 0.6 MG/DL
BUN SERPL-MCNC: 12.1 MG/DL (ref 6–20)
CALCIUM SERPL-MCNC: 9.1 MG/DL (ref 8.6–10)
CHLORIDE SERPL-SCNC: 107 MMOL/L (ref 98–107)
CHOLEST SERPL-MCNC: 156 MG/DL
CREAT SERPL-MCNC: 0.82 MG/DL (ref 0.67–1.17)
DEPRECATED HCO3 PLAS-SCNC: 22 MMOL/L (ref 22–29)
EGFRCR SERPLBLD CKD-EPI 2021: >90 ML/MIN/1.73M2
EOSINOPHIL # BLD AUTO: 0.2 10E3/UL (ref 0–0.7)
EOSINOPHIL NFR BLD AUTO: 1 %
GLUCOSE SERPL-MCNC: 106 MG/DL (ref 70–99)
HDLC SERPL-MCNC: 33 MG/DL
IMM GRANULOCYTES # BLD: 0 10E3/UL
IMM GRANULOCYTES NFR BLD: 0 %
LDLC SERPL CALC-MCNC: 84 MG/DL
LYMPHOCYTES # BLD AUTO: 3.1 10E3/UL (ref 0.8–5.3)
LYMPHOCYTES NFR BLD AUTO: 25 %
MONOCYTES # BLD AUTO: 0.8 10E3/UL (ref 0–1.3)
MONOCYTES NFR BLD AUTO: 6 %
NEUTROPHILS # BLD AUTO: 8.2 10E3/UL (ref 1.6–8.3)
NEUTROPHILS NFR BLD AUTO: 67 %
NONHDLC SERPL-MCNC: 123 MG/DL
POTASSIUM SERPL-SCNC: 4.1 MMOL/L (ref 3.4–5.3)
PROT SERPL-MCNC: 7.4 G/DL (ref 6.4–8.3)
PSA SERPL DL<=0.01 NG/ML-MCNC: 1.26 NG/ML (ref 0–3.5)
SODIUM SERPL-SCNC: 140 MMOL/L (ref 135–145)
TRIGL SERPL-MCNC: 197 MG/DL
WBC # BLD AUTO: 12.2 10E3/UL (ref 4–11)

## 2023-11-10 NOTE — RESULT ENCOUNTER NOTE
Please inform of results if patient has not viewed in [employer] within 3 business days.    Your cell counts were normal.    Please call the clinic with any questions you may have.     Have a great day,    Dr. Pantoja

## 2023-11-13 NOTE — RESULT ENCOUNTER NOTE
"Please inform of results if patient has not viewed in [employer] within 3 business days.    Lipids - Your \"bad\" cholesterol lab results were normal. Your \"good\" cholesterol, or HDL cholesterol was low. This can be improved with exercise.    Continue on your Lipitor medication.    CMP Results - Your blood sugar was 106. Your kidney function, electrolytes, and liver function were normal.    PSA - Your Prostate cancer screening lab results were normal.    Please call the clinic with any questions you may have.     Have a great day,    Dr. Pantoja"

## 2024-06-15 ENCOUNTER — TRANSFERRED RECORDS (OUTPATIENT)
Dept: HEALTH INFORMATION MANAGEMENT | Facility: CLINIC | Age: 54
End: 2024-06-15

## 2024-07-20 ASSESSMENT — ASTHMA QUESTIONNAIRES
QUESTION_1 LAST FOUR WEEKS HOW MUCH OF THE TIME DID YOUR ASTHMA KEEP YOU FROM GETTING AS MUCH DONE AT WORK, SCHOOL OR AT HOME: NONE OF THE TIME
QUESTION_2 LAST FOUR WEEKS HOW OFTEN HAVE YOU HAD SHORTNESS OF BREATH: NOT AT ALL
QUESTION_5 LAST FOUR WEEKS HOW WOULD YOU RATE YOUR ASTHMA CONTROL: COMPLETELY CONTROLLED
ACT_TOTALSCORE: 24
QUESTION_4 LAST FOUR WEEKS HOW OFTEN HAVE YOU USED YOUR RESCUE INHALER OR NEBULIZER MEDICATION (SUCH AS ALBUTEROL): ONCE A WEEK OR LESS
ACT_TOTALSCORE: 24
QUESTION_3 LAST FOUR WEEKS HOW OFTEN DID YOUR ASTHMA SYMPTOMS (WHEEZING, COUGHING, SHORTNESS OF BREATH, CHEST TIGHTNESS OR PAIN) WAKE YOU UP AT NIGHT OR EARLIER THAN USUAL IN THE MORNING: NOT AT ALL

## 2024-07-25 ENCOUNTER — OFFICE VISIT (OUTPATIENT)
Dept: FAMILY MEDICINE | Facility: CLINIC | Age: 54
End: 2024-07-25
Payer: COMMERCIAL

## 2024-07-25 VITALS
HEART RATE: 96 BPM | DIASTOLIC BLOOD PRESSURE: 78 MMHG | SYSTOLIC BLOOD PRESSURE: 138 MMHG | HEIGHT: 70 IN | RESPIRATION RATE: 20 BRPM | BODY MASS INDEX: 39.65 KG/M2 | TEMPERATURE: 98 F | OXYGEN SATURATION: 98 % | WEIGHT: 277 LBS

## 2024-07-25 DIAGNOSIS — M17.31 POST-TRAUMATIC OSTEOARTHRITIS OF RIGHT KNEE: ICD-10-CM

## 2024-07-25 DIAGNOSIS — R73.03 PREDIABETES: ICD-10-CM

## 2024-07-25 DIAGNOSIS — E78.5 HYPERLIPIDEMIA LDL GOAL <70: ICD-10-CM

## 2024-07-25 DIAGNOSIS — J45.30 MILD PERSISTENT ASTHMA WITHOUT COMPLICATION: ICD-10-CM

## 2024-07-25 DIAGNOSIS — Z01.818 PREOP GENERAL PHYSICAL EXAM: Primary | ICD-10-CM

## 2024-07-25 DIAGNOSIS — G47.33 OSA ON CPAP: ICD-10-CM

## 2024-07-25 LAB
ERYTHROCYTE [DISTWIDTH] IN BLOOD BY AUTOMATED COUNT: 12.3 % (ref 10–15)
HCT VFR BLD AUTO: 43.6 % (ref 40–53)
HGB BLD-MCNC: 14.7 G/DL (ref 13.3–17.7)
MCH RBC QN AUTO: 31.5 PG (ref 26.5–33)
MCHC RBC AUTO-ENTMCNC: 33.7 G/DL (ref 31.5–36.5)
MCV RBC AUTO: 94 FL (ref 78–100)
PLATELET # BLD AUTO: 221 10E3/UL (ref 150–450)
RBC # BLD AUTO: 4.66 10E6/UL (ref 4.4–5.9)
WBC # BLD AUTO: 11.4 10E3/UL (ref 4–11)

## 2024-07-25 PROCEDURE — 99214 OFFICE O/P EST MOD 30 MIN: CPT | Performed by: PHYSICIAN ASSISTANT

## 2024-07-25 PROCEDURE — 80048 BASIC METABOLIC PNL TOTAL CA: CPT | Performed by: PHYSICIAN ASSISTANT

## 2024-07-25 PROCEDURE — 36415 COLL VENOUS BLD VENIPUNCTURE: CPT | Performed by: PHYSICIAN ASSISTANT

## 2024-07-25 PROCEDURE — 93000 ELECTROCARDIOGRAM COMPLETE: CPT | Performed by: PHYSICIAN ASSISTANT

## 2024-07-25 PROCEDURE — 85027 COMPLETE CBC AUTOMATED: CPT | Performed by: PHYSICIAN ASSISTANT

## 2024-07-25 ASSESSMENT — PAIN SCALES - GENERAL: PAINLEVEL: MILD PAIN (2)

## 2024-07-25 NOTE — PATIENT INSTRUCTIONS

## 2024-07-25 NOTE — PROGRESS NOTES
Preoperative Evaluation  [address] [address]  Phone: [phone]  Primary Provider: Bo Mendez MD  Pre-op Performing Provider: Danya Alfred PA-C  Jul 25, 2024 7/20/2024   Surgical Information   What procedure is being done? Right knee replacement   Facility or Hospital where procedure/surgery will be performed: [facility]   Who is doing the procedure / surgery? Dr. lance Morin   Date of surgery / procedure: August 15, 2024   Time of surgery / procedure: TBD   Where do you plan to recover after surgery? at home with family        Fax number for surgical facility: Note does not need to be faxed, will be available electronically in Epic.    Assessment & Plan     The proposed surgical procedure is considered INTERMEDIATE risk.    Problem List Items Addressed This Visit          Respiratory    Mild persistent asthma without complication    CAMILO on CPAP       Endocrine    Hyperlipidemia LDL goal <70    Prediabetes     Other Visit Diagnoses       Preop general physical exam    -  Primary    Relevant Orders    EKG 12-lead complete w/read - Clinics    Basic metabolic panel  (Ca, Cl, CO2, Creat, Gluc, K, Na, BUN)    CBC with platelets    Post-traumatic osteoarthritis of right knee                       Risks and Recommendations  The patient has the following additional risks and recommendations for perioperative complications:   - Morbid obesity (BMI >40)  Obstructive Sleep Apnea:   Bring CPAP to the surgery    Antiplatelet or Anticoagulation Medication Instructions   - Patient is on no antiplatelet or anticoagulation medications.    Additional Medication Instructions   - Statins: Continue taking on the day of surgery.    - LABA, inhaled corticosteroid, long-acting anticholinergics: Continue without modification.   - rescue Inhaler: Continue PRN. Bring to hospital on the day of  surgery.    Recommendation  Approval given to proceed with proposed procedure, without further diagnostic evaluation.    Ana Maria Verde is a 54 year old, presenting for the following:  Pre-Op Exam          7/25/2024     3:59 PM   Additional Questions   Roomed by DM   Accompanied by self     HPI related to upcoming procedure: right knee postraumatic OA requiring total joint replacement         7/20/2024   Pre-Op Questionnaire   Have you ever had a heart attack or stroke? No   Have you ever had surgery on your heart or blood vessels, such as a stent placement, a coronary artery bypass, or surgery on an artery in your head, neck, heart, or legs? No   Do you have chest pain with activity? No   Do you have a history of heart failure? No   Do you currently have a cold, bronchitis or symptoms of other infection? No   Do you have a cough, shortness of breath, or wheezing? No   Do you or anyone in your family have previous history of blood clots? No   Do you or does anyone in your family have a serious bleeding problem such as prolonged bleeding following surgeries or cuts? No   Have you ever had problems with anemia or been told to take iron pills? No   Have you had any abnormal blood loss such as black, tarry or bloody stools? No   Have you ever had a blood transfusion? No   Are you willing to have a blood transfusion if it is medically needed before, during, or after your surgery? Yes   Have you or any of your relatives ever had problems with anesthesia? No   Do you have sleep apnea, excessive snoring or daytime drowsiness? (!) YES   Do you have a CPAP machine? Yes   Do you have any artifical heart valves or other implanted medical devices like a pacemaker, defibrillator, or continuous glucose monitor? No   Do you have artificial joints? No   Are you allergic to latex? No        Health Care Directive  Patient does not have a Health Care Directive or Living Will: Patient states has Advance Directive and will bring in a  copy to clinic.  FULL CODE  Preoperative Review of    reviewed - no record of controlled substances prescribed.      Status of Chronic Conditions:  See problem list for active medical problems.  Problems all longstanding and stable, except as noted/documented.  See ROS for pertinent symptoms related to these conditions.    Patient Active Problem List    Diagnosis Date Noted    Agatston coronary artery calcium score less than 100 08/06/2020     Priority: Medium    Seasonal allergic rhinitis, unspecified trigger 12/03/2019     Priority: Medium    Prediabetes 08/07/2018     Priority: Medium    CAMILO on CPAP 08/06/2018     Priority: Medium    Hyperlipidemia LDL goal <70 08/06/2018     Priority: Medium    Mild persistent asthma without complication 01/15/2018     Priority: Medium    Morbid obesity with BMI of 45.0-49.9, adult (H) 11/27/2017     Priority: Medium      Past Medical History:   Diagnosis Date    Hyperlipidemia LDL goal <100     Mild persistent asthma     Morbid obesity (H)     CAMILO on CPAP     Prediabetes      Past Surgical History:   Procedure Laterality Date    ABDOMEN SURGERY  hernia (2012)    COLONOSCOPY  2018    HERNIA REPAIR  2014     Current Outpatient Medications   Medication Sig Dispense Refill    albuterol (PROAIR HFA/PROVENTIL HFA/VENTOLIN HFA) 108 (90 Base) MCG/ACT inhaler Inhale 2 puffs into the lungs every 4 hours as needed for shortness of breath / dyspnea or wheezing 2 Inhaler 5    atorvastatin (LIPITOR) 40 MG tablet Take 1 tablet (40 mg) by mouth daily 90 tablet 3    budesonide-formoterol (SYMBICORT) 160-4.5 MCG/ACT Inhaler Inhale 2 puffs into the lungs 2 times daily 30 g 1    fluticasone (FLONASE) 50 MCG/ACT nasal spray INSTILL 2 SPRAYS INTO BOTH NOSTRILS DAILY 16 mL 0    RESTASIS MULTIDOSE 0.05 % ophthalmic emulsion   2    tamsulosin (FLOMAX) 0.4 MG capsule Take 1 capsule (0.4 mg) by mouth daily 90 capsule 3       Allergies   Allergen Reactions    Cat Hair Extract Cough, Difficulty  "breathing and Shortness Of Breath        Social History     Tobacco Use    Smoking status: Never    Smokeless tobacco: Never   Substance Use Topics    Alcohol use: No     Family History   Problem Relation Age of Onset    Hypertension Mother     Heart Disease Father     Cerebrovascular Disease Father     Diabetes Paternal Grandfather     Prostate Cancer No family hx of     Colon Cancer No family hx of     Ulcerative Colitis No family hx of     Crohn's Disease No family hx of     Thyroid Disease No family hx of      History   Drug Use Unknown             Review of Systems  Constitutional, HEENT, cardiovascular, pulmonary, gi and gu systems are negative, except as otherwise noted.    Objective    /78   Pulse 96   Temp 98  F (36.7  C)   Resp 20   Ht 1.771 m (5' 9.72\")   Wt 125.6 kg (277 lb)   SpO2 98%   BMI 40.07 kg/m     Estimated body mass index is 40.07 kg/m  as calculated from the following:    Height as of this encounter: 1.771 m (5' 9.72\").    Weight as of this encounter: 125.6 kg (277 lb).  Physical Exam  GENERAL: alert and no distress  EYES: Eyes grossly normal to inspection, PERRL and conjunctivae and sclerae normal  HENT: ear canals and TM's normal, nose and mouth without ulcers or lesions  NECK: no adenopathy, no asymmetry, masses, or scars  RESP: lungs clear to auscultation - no rales, rhonchi or wheezes  CV: regular rate and rhythm, normal S1 S2, no S3 or S4, no murmur, click or rub, no peripheral edema  ABDOMEN: soft, nontender, no hepatosplenomegaly, no masses and bowel sounds normal  MS: no gross musculoskeletal defects noted, no edema  SKIN: no suspicious lesions or rashes  NEURO: Normal strength and tone, mentation intact and speech normal  PSYCH: mentation appears normal, affect normal/bright    Recent Labs   Lab Test 11/09/23  1609   HGB 15.2         POTASSIUM 4.1   CR 0.82   A1C 6.1*        Diagnostics  No labs were ordered during this visit.   EKG: appears normal, NSR, " normal axis, normal intervals, no acute ST/T changes c/w ischemia, no LVH by voltage criteria, unchanged from previous tracings    Revised Cardiac Risk Index (RCRI)  The patient has the following serious cardiovascular risks for perioperative complications:   - No serious cardiac risks = 0 points     RCRI Interpretation: 0 points: Class I (very low risk - 0.4% complication rate)         Signed Electronically by: Danya Alfred PA-C  Reviewed and agree with assessment and plan above. Cassius Mahmood MD    A copy of this evaluation report is provided to the requesting physician.

## 2024-07-26 LAB
ANION GAP SERPL CALCULATED.3IONS-SCNC: 10 MMOL/L (ref 7–15)
BUN SERPL-MCNC: 12.4 MG/DL (ref 6–20)
CALCIUM SERPL-MCNC: 9 MG/DL (ref 8.8–10.4)
CHLORIDE SERPL-SCNC: 104 MMOL/L (ref 98–107)
CREAT SERPL-MCNC: 0.81 MG/DL (ref 0.67–1.17)
EGFRCR SERPLBLD CKD-EPI 2021: >90 ML/MIN/1.73M2
GLUCOSE SERPL-MCNC: 116 MG/DL (ref 70–99)
HCO3 SERPL-SCNC: 26 MMOL/L (ref 22–29)
POTASSIUM SERPL-SCNC: 3.9 MMOL/L (ref 3.4–5.3)
SODIUM SERPL-SCNC: 140 MMOL/L (ref 135–145)

## 2024-07-26 NOTE — PROGRESS NOTES
"Dr Mahmood Cosigned Pre op notes. Visit notes state:  \"Fax number for surgical facility: Note does not need to be faxed, will be available electronically in Epic \"    Grace Pop MA  [facility]   Primary Care    "

## 2024-09-26 DIAGNOSIS — R39.9 LOWER URINARY TRACT SYMPTOMS (LUTS): ICD-10-CM

## 2024-09-26 RX ORDER — TAMSULOSIN HYDROCHLORIDE 0.4 MG/1
0.4 CAPSULE ORAL DAILY
Qty: 90 CAPSULE | Refills: 0 | Status: SHIPPED | OUTPATIENT
Start: 2024-09-26

## 2024-11-11 ENCOUNTER — OFFICE VISIT (OUTPATIENT)
Dept: FAMILY MEDICINE | Facility: CLINIC | Age: 54
End: 2024-11-11
Attending: FAMILY MEDICINE
Payer: COMMERCIAL

## 2024-11-11 VITALS
OXYGEN SATURATION: 95 % | DIASTOLIC BLOOD PRESSURE: 82 MMHG | RESPIRATION RATE: 16 BRPM | WEIGHT: 292 LBS | HEART RATE: 65 BPM | BODY MASS INDEX: 41.8 KG/M2 | HEIGHT: 70 IN | SYSTOLIC BLOOD PRESSURE: 134 MMHG | TEMPERATURE: 98.1 F

## 2024-11-11 DIAGNOSIS — Z23 NEED FOR TDAP VACCINATION: ICD-10-CM

## 2024-11-11 DIAGNOSIS — Z00.00 ROUTINE GENERAL MEDICAL EXAMINATION AT A HEALTH CARE FACILITY: Primary | ICD-10-CM

## 2024-11-11 DIAGNOSIS — R73.03 PREDIABETES: ICD-10-CM

## 2024-11-11 DIAGNOSIS — R39.9 LOWER URINARY TRACT SYMPTOMS (LUTS): ICD-10-CM

## 2024-11-11 DIAGNOSIS — J45.30 MILD PERSISTENT ASTHMA WITHOUT COMPLICATION: ICD-10-CM

## 2024-11-11 DIAGNOSIS — E78.5 HYPERLIPIDEMIA LDL GOAL <70: ICD-10-CM

## 2024-11-11 LAB
ALBUMIN SERPL BCG-MCNC: 4 G/DL (ref 3.5–5.2)
ALP SERPL-CCNC: 117 U/L (ref 40–150)
ALT SERPL W P-5'-P-CCNC: 38 U/L (ref 0–70)
ANION GAP SERPL CALCULATED.3IONS-SCNC: 12 MMOL/L (ref 7–15)
AST SERPL W P-5'-P-CCNC: 37 U/L (ref 0–45)
BASOPHILS # BLD AUTO: 0 10E3/UL (ref 0–0.2)
BASOPHILS NFR BLD AUTO: 0 %
BILIRUB SERPL-MCNC: 0.5 MG/DL
BUN SERPL-MCNC: 10.3 MG/DL (ref 6–20)
CALCIUM SERPL-MCNC: 9.1 MG/DL (ref 8.8–10.4)
CHLORIDE SERPL-SCNC: 104 MMOL/L (ref 98–107)
CHOLEST SERPL-MCNC: 148 MG/DL
CREAT SERPL-MCNC: 0.72 MG/DL (ref 0.67–1.17)
EGFRCR SERPLBLD CKD-EPI 2021: >90 ML/MIN/1.73M2
EOSINOPHIL # BLD AUTO: 0.2 10E3/UL (ref 0–0.7)
EOSINOPHIL NFR BLD AUTO: 2 %
ERYTHROCYTE [DISTWIDTH] IN BLOOD BY AUTOMATED COUNT: 12.6 % (ref 10–15)
EST. AVERAGE GLUCOSE BLD GHB EST-MCNC: 128 MG/DL
FASTING STATUS PATIENT QL REPORTED: YES
FASTING STATUS PATIENT QL REPORTED: YES
GLUCOSE SERPL-MCNC: 140 MG/DL (ref 70–99)
HBA1C MFR BLD: 6.1 % (ref 0–5.6)
HCO3 SERPL-SCNC: 22 MMOL/L (ref 22–29)
HCT VFR BLD AUTO: 43.2 % (ref 40–53)
HDLC SERPL-MCNC: 34 MG/DL
HGB BLD-MCNC: 14.6 G/DL (ref 13.3–17.7)
IMM GRANULOCYTES # BLD: 0 10E3/UL
IMM GRANULOCYTES NFR BLD: 0 %
LDLC SERPL CALC-MCNC: 86 MG/DL
LYMPHOCYTES # BLD AUTO: 2.4 10E3/UL (ref 0.8–5.3)
LYMPHOCYTES NFR BLD AUTO: 29 %
MCH RBC QN AUTO: 30.9 PG (ref 26.5–33)
MCHC RBC AUTO-ENTMCNC: 33.8 G/DL (ref 31.5–36.5)
MCV RBC AUTO: 92 FL (ref 78–100)
MONOCYTES # BLD AUTO: 0.6 10E3/UL (ref 0–1.3)
MONOCYTES NFR BLD AUTO: 7 %
NEUTROPHILS # BLD AUTO: 5.1 10E3/UL (ref 1.6–8.3)
NEUTROPHILS NFR BLD AUTO: 62 %
NONHDLC SERPL-MCNC: 114 MG/DL
PLATELET # BLD AUTO: 220 10E3/UL (ref 150–450)
POTASSIUM SERPL-SCNC: 3.9 MMOL/L (ref 3.4–5.3)
PROT SERPL-MCNC: 7.4 G/DL (ref 6.4–8.3)
PSA SERPL DL<=0.01 NG/ML-MCNC: 0.67 NG/ML (ref 0–3.5)
RBC # BLD AUTO: 4.72 10E6/UL (ref 4.4–5.9)
SODIUM SERPL-SCNC: 138 MMOL/L (ref 135–145)
TRIGL SERPL-MCNC: 138 MG/DL
WBC # BLD AUTO: 8.2 10E3/UL (ref 4–11)

## 2024-11-11 PROCEDURE — 80053 COMPREHEN METABOLIC PANEL: CPT | Performed by: FAMILY MEDICINE

## 2024-11-11 PROCEDURE — 36415 COLL VENOUS BLD VENIPUNCTURE: CPT | Performed by: FAMILY MEDICINE

## 2024-11-11 PROCEDURE — 99396 PREV VISIT EST AGE 40-64: CPT | Mod: 25 | Performed by: FAMILY MEDICINE

## 2024-11-11 PROCEDURE — 83036 HEMOGLOBIN GLYCOSYLATED A1C: CPT | Performed by: FAMILY MEDICINE

## 2024-11-11 PROCEDURE — 99214 OFFICE O/P EST MOD 30 MIN: CPT | Mod: 25 | Performed by: FAMILY MEDICINE

## 2024-11-11 PROCEDURE — 80061 LIPID PANEL: CPT | Performed by: FAMILY MEDICINE

## 2024-11-11 PROCEDURE — 90471 IMMUNIZATION ADMIN: CPT | Performed by: FAMILY MEDICINE

## 2024-11-11 PROCEDURE — G0103 PSA SCREENING: HCPCS | Performed by: FAMILY MEDICINE

## 2024-11-11 PROCEDURE — 90715 TDAP VACCINE 7 YRS/> IM: CPT | Performed by: FAMILY MEDICINE

## 2024-11-11 PROCEDURE — 85025 COMPLETE CBC W/AUTO DIFF WBC: CPT | Performed by: FAMILY MEDICINE

## 2024-11-11 RX ORDER — TAMSULOSIN HYDROCHLORIDE 0.4 MG/1
0.4 CAPSULE ORAL DAILY
Qty: 90 CAPSULE | Refills: 3 | Status: SHIPPED | OUTPATIENT
Start: 2024-11-11

## 2024-11-11 RX ORDER — BUDESONIDE AND FORMOTEROL FUMARATE DIHYDRATE 160; 4.5 UG/1; UG/1
2 AEROSOL RESPIRATORY (INHALATION) 2 TIMES DAILY
Qty: 30 G | Refills: 5 | Status: SHIPPED | OUTPATIENT
Start: 2024-11-11

## 2024-11-11 RX ORDER — ATORVASTATIN CALCIUM 40 MG/1
40 TABLET, FILM COATED ORAL DAILY
Qty: 90 TABLET | Refills: 3 | Status: SHIPPED | OUTPATIENT
Start: 2024-11-11

## 2024-11-11 SDOH — HEALTH STABILITY: PHYSICAL HEALTH: ON AVERAGE, HOW MANY MINUTES DO YOU ENGAGE IN EXERCISE AT THIS LEVEL?: 10 MIN

## 2024-11-11 SDOH — HEALTH STABILITY: PHYSICAL HEALTH: ON AVERAGE, HOW MANY DAYS PER WEEK DO YOU ENGAGE IN MODERATE TO STRENUOUS EXERCISE (LIKE A BRISK WALK)?: 1 DAY

## 2024-11-11 ASSESSMENT — ASTHMA QUESTIONNAIRES
QUESTION_2 LAST FOUR WEEKS HOW OFTEN HAVE YOU HAD SHORTNESS OF BREATH: ONCE OR TWICE A WEEK
QUESTION_5 LAST FOUR WEEKS HOW WOULD YOU RATE YOUR ASTHMA CONTROL: WELL CONTROLLED
QUESTION_1 LAST FOUR WEEKS HOW MUCH OF THE TIME DID YOUR ASTHMA KEEP YOU FROM GETTING AS MUCH DONE AT WORK, SCHOOL OR AT HOME: NONE OF THE TIME
QUESTION_3 LAST FOUR WEEKS HOW OFTEN DID YOUR ASTHMA SYMPTOMS (WHEEZING, COUGHING, SHORTNESS OF BREATH, CHEST TIGHTNESS OR PAIN) WAKE YOU UP AT NIGHT OR EARLIER THAN USUAL IN THE MORNING: NOT AT ALL
ACT_TOTALSCORE: 23
ACT_TOTALSCORE: 23
QUESTION_4 LAST FOUR WEEKS HOW OFTEN HAVE YOU USED YOUR RESCUE INHALER OR NEBULIZER MEDICATION (SUCH AS ALBUTEROL): NOT AT ALL

## 2024-11-11 ASSESSMENT — SOCIAL DETERMINANTS OF HEALTH (SDOH): HOW OFTEN DO YOU GET TOGETHER WITH FRIENDS OR RELATIVES?: MORE THAN THREE TIMES A WEEK

## 2024-11-11 ASSESSMENT — PAIN SCALES - GENERAL: PAINLEVEL_OUTOF10: NO PAIN (0)

## 2024-11-11 NOTE — PROGRESS NOTES
Preventive Care Visit  [facility] CHRISTINA Mendez MD, Family Medicine  Nov 11, 2024    Assessment & Plan   1. Routine general medical examination at a health care facility (Primary)  Discussed personal health and safety. Routine screenings ordered as below. Appropriate anticipatory guidance, vaccinations, and health screening recommendations delivered according to the USPSTF and other appropriate society guidelines. Ammon reports understanding and in agreement with this mutually agreed upon plan.    Today's Orders:  - PRIMARY CARE FOLLOW-UP SCHEDULING  - REVIEW OF HEALTH MAINTENANCE PROTOCOL ORDERS  - TDAP 10-64Y (ADACEL,BOOSTRIX)  - Hemoglobin A1c; Future  - Comprehensive metabolic panel (BMP + Alb, Alk Phos, ALT, AST, Total. Bili, TP); Future  - PSA, screen; Future  - CBC with platelets and differential; Future  - Lipid panel reflex to direct LDL Non-fasting; Future    2. Hyperlipidemia LDL goal <70  Check labs to monitor efficacy of interventions (medication, lifestyle, etc). No side effects reported. Continue on current therapy. See medication list for more details. Ok for refill of current lipid lowering medications for next 1 year. Will need follow-up visit with labs in 1 year.  - atorvastatin (LIPITOR) 40 MG tablet; Take 1 tablet (40 mg) by mouth daily.  Dispense: 90 tablet; Refill: 3    3. Mild persistent asthma without complication  Ammon presents today with controlled asthma as evidenced by ACT below and today's clinical findings. See medication list for current regiment.         9/27/2023     9:22 PM 7/20/2024     9:25 AM 11/11/2024     6:44 AM   ACT Total Scores   ACT TOTAL SCORE (Goal Greater than or Equal to 20) 22 24 23    In the past 12 months, how many times did you visit the emergency room for your asthma without being admitted to the hospital? 0  0  0    In the past 12 months, how many times were you hospitalized overnight because of your asthma? 0  0  0         Patient-reported       A comprehensive plan was outlined to continue to address Ammon's asthma. Discussed importance of asthma control, avoidance of asthma triggers, proper inhaler technique, adherence to prescribed pharmacotherapy, and to continue regular exercise and physical activity.    The patient agreed with the proposed plan, demonstrating an understanding of the importance of medication adherence and trigger avoidance. This integrated approach aims to optimize asthma management while ensuring the Ammon's overall pulmonary health.    Follow-up in 6 months.    Today's Orders:  - budesonide-formoterol (SYMBICORT) 160-4.5 MCG/ACT Inhaler; Inhale 2 puffs into the lungs 2 times daily.  Dispense: 30 g; Refill: 5    4. Lower urinary tract symptoms (LUTS)  Controlled. Refills given  - tamsulosin (FLOMAX) 0.4 MG capsule; Take 1 capsule (0.4 mg) by mouth daily.  Dispense: 90 capsule; Refill: 3    5. Prediabetes  Monitor yearly A1c. Encourage weight loss and healthy diet.    6. Need for Tdap vaccination  - TDAP 10-64Y (ADACEL,BOOSTRIX)  - VACCINE ADMINISTRATION, INITIAL     Follow-up Visit   Expected date:  Nov 11, 2025 (Approximate)      Follow Up Appointment Details:     Follow-up with whom?: PCP    Follow-Up for what?: Adult Preventive    How?: In Person               Bo Mendez MD  [facility]    Disclaimer: This note consists of symbols derived from keyboarding, dictation and/or voice recognition software. As a result, there may be errors in the script that have gone undetected. Please consider this when interpreting information found in this chart.    Ana Maria Verde is a 54 year old, presenting for the following:  Physical        11/11/2024     6:55 AM   Additional Questions   Roomed by YK   Accompanied by self        HPI  Dry cough for about a month.    Health Care Directive  Patient does not have a Health Care Directive: Discussed advance care planning with patient;  however, patient declined at this time.      11/11/2024   General Health   How would you rate your overall physical health? Good   Feel stress (tense, anxious, or unable to sleep) Not at all            11/11/2024   Nutrition   Three or more servings of calcium each day? (!) NO   Diet: Regular (no restrictions)   How many servings of fruit and vegetables per day? (!) 2-3   How many sweetened beverages each day? 0-1            11/11/2024   Exercise   Days per week of moderate/strenous exercise 1 day   Average minutes spent exercising at this level 10 min      (!) EXERCISE CONCERN      11/11/2024   Social Factors   Frequency of gathering with friends or relatives More than three times a week   Worry food won't last until get money to buy more No   Food not last or not have enough money for food? No   Do you have housing? (Housing is defined as stable permanent housing and does not include staying ouside in a car, in a tent, in an abandoned building, in an overnight shelter, or couch-surfing.) Yes   Are you worried about losing your housing? No   Lack of transportation? No   Unable to get utilities (heat,electricity)? No            11/11/2024   Fall Risk   Fallen 2 or more times in the past year? No    Trouble with walking or balance? No        Patient-reported          11/11/2024   Dental   Dentist two times every year? Yes          11/11/2024   TB Screening   Were you born outside of the [place]? No      Today's PHQ-2 Score:       11/11/2024     6:43 AM   PHQ-2 ( 1999 [employer])   Q1: Little interest or pleasure in doing things 0    Q2: Feeling down, depressed or hopeless 0    PHQ-2 Score 0    Q1: Little interest or pleasure in doing things Not at all   Q2: Feeling down, depressed or hopeless Not at all   PHQ-2 Score 0       Patient-reported         11/11/2024   Substance Use   Alcohol more than 3/day or more than 7/wk No   Do you use any other substances recreationally? No      Social History     Tobacco Use    Smoking  "status: Never    Smokeless tobacco: Never   Vaping Use    Vaping status: Never Used   Substance Use Topics    Alcohol use: No    Drug use: Never         11/11/2024   STI Screening   New sexual partner(s) since last STI/HIV test? No      Last PSA:   PSA   Date Value Ref Range Status   11/13/2019 0.91 0 - 4 ug/L Final     Comment:     Assay Method:  Chemiluminescence using Siemens Vista analyzer     Prostate Specific Antigen Screen   Date Value Ref Range Status   11/09/2023 1.26 0.00 - 3.50 ng/mL Final   10/03/2022 0.94 0.00 - 4.00 ug/L Final     ASCVD Risk   The 10-year ASCVD risk score (Jarrett LOVE, et al., 2019) is: 7.2%    Values used to calculate the score:      Age: 54 years      Sex: Male      Is Non- : No      Diabetic: No      Tobacco smoker: No      Systolic Blood Pressure: 134 mmHg      Is BP treated: No      HDL Cholesterol: 33 mg/dL      Total Cholesterol: 156 mg/dL    Reviewed and updated as needed this visit by Provider   Tobacco  Allergies  Meds  Problems  Med Hx  Surg Hx  Fam Hx        Social Hx Reviewed    Past Medical History:   Diagnosis Date    Hyperlipidemia LDL goal <100     Mild persistent asthma     Morbid obesity (H)     CAMILO on CPAP     Prediabetes      Past Surgical History:   Procedure Laterality Date    ABDOMEN SURGERY  hernia (2012)    COLONOSCOPY  2018    HERNIA REPAIR  2014     Review of Systems  Constitutional, HEENT, cardiovascular, pulmonary, GI, , musculoskeletal, neuro, skin, endocrine and psych systems are negative, except as otherwise noted.     Objective    Exam  /82   Pulse 65   Temp 98.1  F (36.7  C) (Temporal)   Resp 16   Ht 1.788 m (5' 10.39\")   Wt 132.5 kg (292 lb)   SpO2 95%   BMI 41.43 kg/m     Estimated body mass index is 41.43 kg/m  as calculated from the following:    Height as of this encounter: 1.788 m (5' 10.39\").    Weight as of this encounter: 132.5 kg (292 lb).    Physical Exam  Constitutional:       " Appearance: He is obese.   HENT:      Head: Normocephalic and atraumatic.      Right Ear: Tympanic membrane, ear canal and external ear normal. There is no impacted cerumen.      Left Ear: Tympanic membrane, ear canal and external ear normal. There is no impacted cerumen.      Nose: Nose normal. No congestion.      Mouth/Throat:      Pharynx: Oropharynx is clear. No oropharyngeal exudate or posterior oropharyngeal erythema.   Eyes:      General:         Right eye: No discharge.         Left eye: No discharge.      Extraocular Movements: Extraocular movements intact.      Conjunctiva/sclera: Conjunctivae normal.      Pupils: Pupils are equal, round, and reactive to light.   Cardiovascular:      Rate and Rhythm: Normal rate and regular rhythm.      Heart sounds: Normal heart sounds. No murmur heard.     No friction rub.   Pulmonary:      Effort: Pulmonary effort is normal. No respiratory distress.      Breath sounds: Normal breath sounds. No wheezing or rhonchi.   Abdominal:      General: Abdomen is flat. There is no distension.      Palpations: Abdomen is soft. There is no mass.      Tenderness: There is no abdominal tenderness. There is no guarding.   Musculoskeletal:         General: No swelling.      Cervical back: Normal range of motion and neck supple. No tenderness.      Right lower leg: No edema.      Left lower leg: No edema.   Lymphadenopathy:      Cervical: No cervical adenopathy.   Skin:     General: Skin is warm.      Findings: No rash.   Neurological:      General: No focal deficit present.      Mental Status: He is alert.      Cranial Nerves: No cranial nerve deficit.      Motor: No weakness.      Coordination: Coordination normal.      Gait: Gait normal.   Psychiatric:         Mood and Affect: Mood normal.         Behavior: Behavior normal.         Thought Content: Thought content normal.         Judgment: Judgment normal.       Labs: Pending    Signed Electronically by: Bo Mendez MD

## 2024-11-11 NOTE — LETTER
"November 14, 2024      Ammon Thibodeaux  [address]  [place] [address]        Dear ,    Here are my comments about your recent results:     CMP Results - Your blood sugar was 140. Your kidney function, electrolytes, and liver function were normal.     Lipids - Your \"bad\" cholesterol lab results were normal. Your \"good\" cholesterol, or HDL cholesterol was low. This can be improved with exercise.     PSA - Your Prostate cancer screening lab results were normal.        Please call the clinic ([phone]), or message us on [employer] with any questions you may have.     Have a great day,     Dr. Pantoja                  "

## 2024-11-11 NOTE — RESULT ENCOUNTER NOTE
"Sally Verde,    If you have not viewed these results on [employer] within 3 days, we will use an alternative method to contact you. We will contact you via the following protocol:    - Via letter if your results are normal.  - Via phone ([phone]) if your results are abnormal.     Here are my comments about your recent results:    CMP Results - Your blood sugar was 140. Your kidney function, electrolytes, and liver function were normal.    Lipids - Your \"bad\" cholesterol lab results were normal. Your \"good\" cholesterol, or HDL cholesterol was low. This can be improved with exercise.    PSA - Your Prostate cancer screening lab results were normal.      Please call the clinic ([phone]), or message us on [employer] with any questions you may have.     Have a great day,    Dr. Pantoja"

## 2024-11-11 NOTE — PATIENT INSTRUCTIONS
Patient Education     Regarding your Cough:  There are many causes for a prolonged cough.   The most common are:  Post nasal drip (phlegm leaking down the back of your throat from your sinuses)  Allergies  Heartburn/reflux (acid coming back up and spilling into your airway)  Irritants such as tobacco smoke, or chemicals at work  Asthma  COPD   Certain medications such as ACE inhibitors (the most commonly used in this area is lisinopril).    To start, we usually try over the counter medications for 1 month first  Nasal Flonase (fluticasone) spray, once each nostril daily for post nasal drip  Acid blocker (Prilosec [Omeprazole] once daily, or Pepcid [Famotidine] twice daily)    If your symptoms do not improve within 1 month we will check a chest xray and lung function tests. Message me if not improved and I will order these for you without the need for another visit. These are more expensive/invasive tests and therefore we usually wait until after the trial of medications above for this.       Preventive Care Advice   This is general advice given by our system to help you stay healthy. However, your care team may have specific advice just for you. Please talk to your care team about your preventive care needs.  Nutrition  Eat 5 or more servings of fruits and vegetables each day.  Try wheat bread, brown rice and whole grain pasta (instead of white bread, rice, and pasta).  Get enough calcium and vitamin D. Check the label on foods and aim for 100% of the RDA (recommended daily allowance).  Lifestyle  Exercise at least 150 minutes each week  (30 minutes a day, 5 days a week).  Do muscle strengthening activities 2 days a week. These help control your weight and prevent disease.  No smoking.  Wear sunscreen to prevent skin cancer.  Have a dental exam and cleaning every 6 months.  Yearly exams  See your health care team every year to talk about:  Any changes in your health.  Any medicines your care team has  prescribed.  Preventive care, family planning, and ways to prevent chronic diseases.  Shots (vaccines)   HPV shots (up to age 26), if you've never had them before.  Hepatitis B shots (up to age 59), if you've never had them before.  COVID-19 shot: Get this shot when it's due.  Flu shot: Get a flu shot every year.  Tetanus shot: Get a tetanus shot every 10 years.  Pneumococcal, hepatitis A, and RSV shots: Ask your care team if you need these based on your risk.  Shingles shot (for age 50 and up)  General health tests  Diabetes screening:  Starting at age 35, Get screened for diabetes at least every 3 years.  If you are younger than age 35, ask your care team if you should be screened for diabetes.  Cholesterol test: At age 39, start having a cholesterol test every 5 years, or more often if advised.  Bone density scan (DEXA): At age 50, ask your care team if you should have this scan for osteoporosis (brittle bones).  Hepatitis C: Get tested at least once in your life.  STIs (sexually transmitted infections)  Before age 24: Ask your care team if you should be screened for STIs.  After age 24: Get screened for STIs if you're at risk. You are at risk for STIs (including HIV) if:  You are sexually active with more than one person.  You don't use condoms every time.  You or a partner was diagnosed with a sexually transmitted infection.  If you are at risk for HIV, ask about PrEP medicine to prevent HIV.  Get tested for HIV at least once in your life, whether you are at risk for HIV or not.  Cancer screening tests  Cervical cancer screening: If you have a cervix, begin getting regular cervical cancer screening tests starting at age 21.  Breast cancer scan (mammogram): If you've ever had breasts, begin having regular mammograms starting at age 40. This is a scan to check for breast cancer.  Colon cancer screening: It is important to start screening for colon cancer at age 45.  Have a colonoscopy test every 10 years (or more  often if you're at risk) Or, ask your provider about stool tests like a FIT test every year or Cologuard test every 3 years.  To learn more about your testing options, visit:   .  For help making a decision, visit:   https://bit.ly/tj17090.  Prostate cancer screening test: If you have a prostate, ask your care team if a prostate cancer screening test (PSA) at age 55 is right for you.  Lung cancer screening: If you are a current or former smoker ages 50 to 80, ask your care team if ongoing lung cancer screenings are right for you.  For informational purposes only. Not to replace the advice of your health care provider. Copyright   2023 [facility]. All rights reserved. Clinically reviewed by the [facility] Transitions Program. [employer] [id number] - REV 01/24.

## 2024-11-11 NOTE — NURSING NOTE
Prior to immunization administration, verified patients identity using patient s name and date of birth. Please see Immunization Activity for additional information.     Screening Questionnaire for Adult Immunization    Are you sick today?   No   Do you have allergies to medications, food, a vaccine component or latex?   No   Have you ever had a serious reaction after receiving a vaccination?   No   Do you have a long-term health problem with heart, lung, kidney, or metabolic disease (e.g., diabetes), asthma, a blood disorder, no spleen, complement component deficiency, a cochlear implant, or a spinal fluid leak?  Are you on long-term aspirin therapy?   No   Do you have cancer, leukemia, HIV/AIDS, or any other immune system problem?   No   Do you have a parent, brother, or sister with an immune system problem?   No   In the past 3 months, have you taken medications that affect  your immune system, such as prednisone, other steroids, or anticancer drugs; drugs for the treatment of rheumatoid arthritis, Crohn s disease, or psoriasis; or have you had radiation treatments?   No   Have you had a seizure, or a brain or other nervous system problem?   No   During the past year, have you received a transfusion of blood or blood    products, or been given immune (gamma) globulin or antiviral drug?   No   For women: Are you pregnant or is there a chance you could become       pregnant during the next month?   No   Have you received any vaccinations in the past 4 weeks?   No     Immunization questionnaire answers were all negative.      Patient instructed to remain in clinic for 15 minutes afterwards, and to report any adverse reactions.     Screening performed by Tequila Rousseau CMA on 11/11/2024 at 7:33 AM.

## 2024-11-11 NOTE — RESULT ENCOUNTER NOTE
Sally Verde,    If you have not viewed these results on [employer] within 3 days, we will use an alternative method to contact you. We will contact you via the following protocol:    - Via letter if your results are normal.  - Via phone ([phone]) if your results are abnormal.     Here are my comments about your recent results:    A1c - Your 3 month blood sugar average was slightly high, between 5.7 and 6.4. This range is indicative of pre-diabetes. Individuals with pre-diabetes have a 10% per year of progressing to diabetes.     We should recheck this every year to monitor for progression to diabetes. Losing weight, a healthy diet, and exercise can help reduce your risk. If you would like to meet with a dietician, or diabetes educator to help you learn more let me know and I will place a referral for you.    CBC Results - Your cell counts were normal.      Please call the clinic ([phone]), or message us on [employer] with any questions you may have.     Have a great day,    Dr. Pantoja

## 2025-01-07 DIAGNOSIS — E78.5 HYPERLIPIDEMIA LDL GOAL <70: ICD-10-CM

## 2025-01-07 RX ORDER — ATORVASTATIN CALCIUM 40 MG/1
40 TABLET, FILM COATED ORAL DAILY
Qty: 90 TABLET | Refills: 3 | OUTPATIENT
Start: 2025-01-07

## 2025-02-12 ENCOUNTER — MYC MEDICAL ADVICE (OUTPATIENT)
Dept: FAMILY MEDICINE | Facility: CLINIC | Age: 55
End: 2025-02-12
Payer: COMMERCIAL

## 2025-02-12 DIAGNOSIS — R11.2 NAUSEA AND VOMITING, UNSPECIFIED VOMITING TYPE: Primary | ICD-10-CM

## 2025-02-12 DIAGNOSIS — K30: ICD-10-CM

## 2025-02-19 NOTE — TELEPHONE ENCOUNTER
REFERRAL INFORMATION:  Referring Provider:  Bo Mendez MD   Referring Clinic:  [facility]   Reason for Visit/Diagnosis:   R11.2 (ICD-10-CM) - Nausea and vomiting, unspecified vomiting type   K30 (ICD-10-CM) - Upper gastrointestinal distress        FUTURE VISIT INFORMATION:  Appointment Date: 6/2/25     NOTES STATUS DETAILS   OFFICE NOTE from Referring Provider N/A    OFFICE NOTE from Other Specialist Care Everywhere [facility]:  5/30/24-Felipa LALA-Gastroenterology    4/29/24-Dr. Jones   MEDICATION LIST Internal    PROCEDURES     ENDOSCOPY  Care Everywhere [facility]:  5/1/24 8/2/18 4/13/16   COLONOSCOPY Care Everywhere    STOOL TESTING     LABS     PERTINENT LABS Care Everywhere    PATHOLOGY REPORTS (RELATED) Care Everywhere EGD 5/1/24   IMAGES     CT In process [facility]:  6/15/24-CT abdomen pelvis     Records Requested   February 19, 2025 2:42 PM  [email or web address]   Facility  [facility]   Outcome Req image

## 2025-03-24 ENCOUNTER — TELEPHONE (OUTPATIENT)
Dept: GASTROENTEROLOGY | Facility: CLINIC | Age: 55
End: 2025-03-24
Payer: COMMERCIAL

## 2025-05-05 ENCOUNTER — VIRTUAL VISIT (OUTPATIENT)
Dept: FAMILY MEDICINE | Facility: CLINIC | Age: 55
End: 2025-05-05
Payer: COMMERCIAL

## 2025-05-05 DIAGNOSIS — R07.89 CHEST PRESSURE: Primary | ICD-10-CM

## 2025-05-05 PROCEDURE — 98006 SYNCH AUDIO-VIDEO EST MOD 30: CPT | Performed by: FAMILY MEDICINE

## 2025-05-05 PROCEDURE — 1126F AMNT PAIN NOTED NONE PRSNT: CPT | Mod: 95 | Performed by: FAMILY MEDICINE

## 2025-05-05 ASSESSMENT — ASTHMA QUESTIONNAIRES
QUESTION_3 LAST FOUR WEEKS HOW OFTEN DID YOUR ASTHMA SYMPTOMS (WHEEZING, COUGHING, SHORTNESS OF BREATH, CHEST TIGHTNESS OR PAIN) WAKE YOU UP AT NIGHT OR EARLIER THAN USUAL IN THE MORNING: TWO OR THREE NIGHTS A WEEK
ACT_TOTALSCORE: 13
QUESTION_4 LAST FOUR WEEKS HOW OFTEN HAVE YOU USED YOUR RESCUE INHALER OR NEBULIZER MEDICATION (SUCH AS ALBUTEROL): ONCE A WEEK OR LESS
QUESTION_1 LAST FOUR WEEKS HOW MUCH OF THE TIME DID YOUR ASTHMA KEEP YOU FROM GETTING AS MUCH DONE AT WORK, SCHOOL OR AT HOME: MOST OF THE TIME
QUESTION_5 LAST FOUR WEEKS HOW WOULD YOU RATE YOUR ASTHMA CONTROL: WELL CONTROLLED
QUESTION_2 LAST FOUR WEEKS HOW OFTEN HAVE YOU HAD SHORTNESS OF BREATH: MORE THAN ONCE A DAY

## 2025-05-05 NOTE — PROGRESS NOTES
Ammon is a 54 year old who is being evaluated via a billable video visit.      How would you like to obtain your AVS? MyChart    If the video visit is dropped, the invitation should be resent by: Text to cell phone: [phone]    Will anyone else be joining your video visit? No    Assessment & Plan   1. Chest pressure (Primary)  Unclear cause. Unlikely ACS given 1 month of constant symptoms not worse with exercise/activity. Will have patient come in for cxr and EKG. Check for anemia, electrolytes, etc. Consider stress test to rule out heart etiology, referral to GI for endoscopy/manometry. Continue PPI. Consider MSK cause. Patient knows that if he gets worsening/new symptoms he needs to go to ED.  - EKG 12-lead complete w/read - Clinics  - XR Chest 2 Views; Future  - Comprehensive metabolic panel (BMP + Alb, Alk Phos, ALT, AST, Total. Bili, TP); Future  - CBC with platelets and differential; Future  - TSH with free T4 reflex; Future      Bo Mendez MD  [facility]    Disclaimer: This note consists of symbols derived from keyboarding, dictation and/or voice recognition software. As a result, there may be errors in the script that have gone undetected. Please consider this when interpreting information found in this chart.    The longitudinal plan of care for the diagnosis(es)/condition(s) as documented were addressed during this visit. Due to the added complexity in care, I will continue to support Ammon in the subsequent management and with ongoing continuity of care.    Subjective   Ammon is a 54 year old, presenting for the following health issues:  Referral and Heart Problem        5/5/2025     4:34 PM   Additional Questions   Roomed by VE     Heart Problem        Chest Pressure  Onset/Duration: couple weeks  Description:   Location: left side, 10 o'clock   Character: pressure  Radiation: none  Duration: constant   Intensity: mild  Progression of Symptoms:  same  Accompanying Signs & Symptoms:  Shortness of breath: No  Sweating: No  Nausea/vomiting: No  Lightheadedness: No  Palpitations: No  Fever/Chills: No  Cough: No           Heartburn: No  History:   Family history of heart disease: YES- paternal grandfather  Tobacco use: No  Previous similar symptoms: YES- had stress test  Precipitating factors:   Worse with exertion: No  Worse with deep breaths: No           Related to eating: No           Better with burping: No  Alleviating factors: none  Therapies tried and outcome: symbicort    Concern of r~4 weeks of chest pressure. [place] trip 4 weeks ago. No swelling in the legs or pain. States he forgot his symbicort for 2-4 days and then restarted it. Felt a little wheezy initially, but then breathing improved. Not worse with exercise such as playing soccer. Constant in nature. On PPI for GERD/gastroparesis. No associated shortness of breath or radiation.       Review of Systems  Constitutional, HEENT, cardiovascular, pulmonary, GI, , musculoskeletal, neuro, skin, endocrine and psych systems are negative, except as otherwise noted.      Objective    Vitals - Patient Reported  Weight (Patient Reported): 130.6 kg (288 lb)  Pain Score: No Pain (0)      Vitals:  No vitals were obtained today due to virtual visit.    Physical Exam     GENERAL: alert and no distress  EYES: Eyes grossly normal to inspection.  No discharge or erythema, or obvious scleral/conjunctival abnormalities.  RESP: No audible wheeze, cough, or visible cyanosis.    SKIN: Visible skin clear. No significant rash, abnormal pigmentation or lesions.  NEURO: Cranial nerves grossly intact.  Mentation and speech appropriate for age.  PSYCH: Appropriate affect, tone, and pace of words    Labs: Pending    EKG: pending      Video-Visit Details    Type of service:  Video Visit   Originating Location (pt. Location): Home    Distant Location (provider location):  On-site    Platform used for Video Visit:  Kosta  Signed Electronically by: Bo Mendez MD

## 2025-05-06 ENCOUNTER — TELEPHONE (OUTPATIENT)
Dept: FAMILY MEDICINE | Facility: CLINIC | Age: 55
End: 2025-05-06
Payer: COMMERCIAL

## 2025-05-06 DIAGNOSIS — R07.89 CHEST PRESSURE: Primary | ICD-10-CM

## 2025-05-06 NOTE — TELEPHONE ENCOUNTER
Patient is coming in on [facility] schedule tomorrow 5/7 for EKG per PCP. Needs EKG order placed for visit. Please sign pending order.    Tequila Rousseau CMA ([facility])

## 2025-05-07 ENCOUNTER — ALLIED HEALTH/NURSE VISIT (OUTPATIENT)
Dept: FAMILY MEDICINE | Facility: CLINIC | Age: 55
End: 2025-05-07
Payer: COMMERCIAL

## 2025-05-07 ENCOUNTER — ANCILLARY PROCEDURE (OUTPATIENT)
Dept: GENERAL RADIOLOGY | Facility: CLINIC | Age: 55
End: 2025-05-07
Attending: FAMILY MEDICINE
Payer: COMMERCIAL

## 2025-05-07 ENCOUNTER — LAB (OUTPATIENT)
Dept: LAB | Facility: CLINIC | Age: 55
End: 2025-05-07
Payer: COMMERCIAL

## 2025-05-07 ENCOUNTER — RESULTS FOLLOW-UP (OUTPATIENT)
Dept: FAMILY MEDICINE | Facility: CLINIC | Age: 55
End: 2025-05-07

## 2025-05-07 VITALS
HEIGHT: 70 IN | DIASTOLIC BLOOD PRESSURE: 73 MMHG | HEART RATE: 64 BPM | WEIGHT: 279 LBS | OXYGEN SATURATION: 95 % | BODY MASS INDEX: 39.94 KG/M2 | SYSTOLIC BLOOD PRESSURE: 116 MMHG | TEMPERATURE: 98.1 F

## 2025-05-07 DIAGNOSIS — R07.89 CHEST PRESSURE: ICD-10-CM

## 2025-05-07 DIAGNOSIS — R07.89 CHEST PRESSURE: Primary | ICD-10-CM

## 2025-05-07 LAB
ALBUMIN SERPL BCG-MCNC: 4.3 G/DL (ref 3.5–5.2)
ALP SERPL-CCNC: 92 U/L (ref 40–150)
ALT SERPL W P-5'-P-CCNC: 46 U/L (ref 0–70)
ANION GAP SERPL CALCULATED.3IONS-SCNC: 10 MMOL/L (ref 7–15)
AST SERPL W P-5'-P-CCNC: 42 U/L (ref 0–45)
BASOPHILS # BLD AUTO: 0 10E3/UL (ref 0–0.2)
BASOPHILS NFR BLD AUTO: 0 %
BILIRUB SERPL-MCNC: 0.8 MG/DL
BUN SERPL-MCNC: 10.1 MG/DL (ref 6–20)
CALCIUM SERPL-MCNC: 9.3 MG/DL (ref 8.8–10.4)
CHLORIDE SERPL-SCNC: 105 MMOL/L (ref 98–107)
CREAT SERPL-MCNC: 0.79 MG/DL (ref 0.67–1.17)
EGFRCR SERPLBLD CKD-EPI 2021: >90 ML/MIN/1.73M2
EOSINOPHIL # BLD AUTO: 0.1 10E3/UL (ref 0–0.7)
EOSINOPHIL NFR BLD AUTO: 1 %
ERYTHROCYTE [DISTWIDTH] IN BLOOD BY AUTOMATED COUNT: 12.6 % (ref 10–15)
GLUCOSE SERPL-MCNC: 110 MG/DL (ref 70–99)
HCO3 SERPL-SCNC: 26 MMOL/L (ref 22–29)
HCT VFR BLD AUTO: 43.9 % (ref 40–53)
HGB BLD-MCNC: 15 G/DL (ref 13.3–17.7)
IMM GRANULOCYTES # BLD: 0 10E3/UL
IMM GRANULOCYTES NFR BLD: 0 %
LYMPHOCYTES # BLD AUTO: 3 10E3/UL (ref 0.8–5.3)
LYMPHOCYTES NFR BLD AUTO: 32 %
MCH RBC QN AUTO: 31.5 PG (ref 26.5–33)
MCHC RBC AUTO-ENTMCNC: 34.2 G/DL (ref 31.5–36.5)
MCV RBC AUTO: 92 FL (ref 78–100)
MONOCYTES # BLD AUTO: 0.6 10E3/UL (ref 0–1.3)
MONOCYTES NFR BLD AUTO: 6 %
NEUTROPHILS # BLD AUTO: 5.6 10E3/UL (ref 1.6–8.3)
NEUTROPHILS NFR BLD AUTO: 60 %
PLATELET # BLD AUTO: 211 10E3/UL (ref 150–450)
POTASSIUM SERPL-SCNC: 4 MMOL/L (ref 3.4–5.3)
PROT SERPL-MCNC: 7.6 G/DL (ref 6.4–8.3)
RBC # BLD AUTO: 4.76 10E6/UL (ref 4.4–5.9)
SODIUM SERPL-SCNC: 141 MMOL/L (ref 135–145)
TSH SERPL DL<=0.005 MIU/L-ACNC: 0.69 UIU/ML (ref 0.3–4.2)
WBC # BLD AUTO: 9.3 10E3/UL (ref 4–11)

## 2025-05-07 PROCEDURE — 93000 ELECTROCARDIOGRAM COMPLETE: CPT

## 2025-05-07 PROCEDURE — 80053 COMPREHEN METABOLIC PANEL: CPT

## 2025-05-07 PROCEDURE — 3074F SYST BP LT 130 MM HG: CPT

## 2025-05-07 PROCEDURE — 71046 X-RAY EXAM CHEST 2 VIEWS: CPT | Mod: TC | Performed by: RADIOLOGY

## 2025-05-07 PROCEDURE — 3078F DIAST BP <80 MM HG: CPT

## 2025-05-07 PROCEDURE — 36415 COLL VENOUS BLD VENIPUNCTURE: CPT

## 2025-05-07 PROCEDURE — 85025 COMPLETE CBC W/AUTO DIFF WBC: CPT

## 2025-05-07 PROCEDURE — 84443 ASSAY THYROID STIM HORMONE: CPT

## 2025-05-07 PROCEDURE — 1126F AMNT PAIN NOTED NONE PRSNT: CPT

## 2025-05-07 PROCEDURE — 99207 PR NO CHARGE NURSE ONLY: CPT

## 2025-05-07 ASSESSMENT — PAIN SCALES - GENERAL: PAINLEVEL_OUTOF10: NO PAIN (0)

## 2025-05-07 NOTE — PROGRESS NOTES
EKG was done. Provider reviewed in clinic, routing as FYI.      Fabiana Mcgowan MA     04-Dec-2023 19:57

## 2025-05-07 NOTE — RESULT ENCOUNTER NOTE
Sally Verde,    Here are my comments about your recent results:    CBC Results - Your cell counts were normal.    EKG was unchanged without signs of current or previous heart attack.    Please call the clinic ([phone]), or message us on [employer] with any questions you may have.     Have a great day,    Dr. Pantoja

## 2025-05-25 ENCOUNTER — MYC MEDICAL ADVICE (OUTPATIENT)
Dept: FAMILY MEDICINE | Facility: CLINIC | Age: 55
End: 2025-05-25
Payer: COMMERCIAL

## 2025-05-25 DIAGNOSIS — R07.89 CHEST PRESSURE: Primary | ICD-10-CM

## 2025-06-02 ENCOUNTER — PRE VISIT (OUTPATIENT)
Dept: GASTROENTEROLOGY | Facility: CLINIC | Age: 55
End: 2025-06-02

## 2025-07-01 DIAGNOSIS — R07.89 CHEST PRESSURE: ICD-10-CM

## 2025-07-01 PROCEDURE — 94729 DIFFUSING CAPACITY: CPT | Performed by: INTERNAL MEDICINE

## 2025-07-01 PROCEDURE — 94060 EVALUATION OF WHEEZING: CPT | Performed by: INTERNAL MEDICINE

## 2025-07-01 PROCEDURE — 94726 PLETHYSMOGRAPHY LUNG VOLUMES: CPT | Performed by: INTERNAL MEDICINE

## 2025-07-02 ENCOUNTER — ANCILLARY PROCEDURE (OUTPATIENT)
Dept: CARDIOLOGY | Facility: CLINIC | Age: 55
End: 2025-07-02
Attending: FAMILY MEDICINE
Payer: COMMERCIAL

## 2025-07-02 ENCOUNTER — RESULTS FOLLOW-UP (OUTPATIENT)
Dept: FAMILY MEDICINE | Facility: CLINIC | Age: 55
End: 2025-07-02

## 2025-07-02 ENCOUNTER — ANCILLARY PROCEDURE (OUTPATIENT)
Dept: CT IMAGING | Facility: CLINIC | Age: 55
End: 2025-07-02
Attending: FAMILY MEDICINE
Payer: COMMERCIAL

## 2025-07-02 DIAGNOSIS — R07.89 CHEST PRESSURE: ICD-10-CM

## 2025-07-02 LAB
DLCOUNC-%PRED-PRE: 94 %
DLCOUNC-PRE: 26.08 ML/MIN/MMHG
DLCOUNC-PRED: 27.73 ML/MIN/MMHG
ERV-%PRED-PRE: 29 %
ERV-PRE: 0.46 L
ERV-PRED: 1.58 L
EXPTIME-PRE: 9.65 SEC
FEF2575-%PRED-POST: 83 %
FEF2575-%PRED-PRE: 62 %
FEF2575-POST: 2.49 L/SEC
FEF2575-PRE: 1.85 L/SEC
FEF2575-PRED: 2.97 L/SEC
FEFMAX-%PRED-PRE: 85 %
FEFMAX-PRE: 8.01 L/SEC
FEFMAX-PRED: 9.32 L/SEC
FEV1-%PRED-PRE: 95 %
FEV1-PRE: 3.22 L
FEV1FEV6-PRE: 70 %
FEV1FEV6-PRED: 79 %
FEV1FVC-PRE: 67 %
FEV1FVC-PRED: 79 %
FEV1SVC-PRE: 61 %
FEV1SVC-PRED: 75 %
FIFMAX-PRE: 7.31 L/SEC
FRCPLETH-%PRED-PRE: 108 %
FRCPLETH-PRE: 3.87 L
FRCPLETH-PRED: 3.55 L
FVC-%PRED-PRE: 112 %
FVC-PRE: 4.84 L
FVC-PRED: 4.31 L
IC-%PRED-PRE: 151 %
IC-PRE: 4.79 L
IC-PRED: 3.15 L
RVPLETH-%PRED-PRE: 145 %
RVPLETH-PRE: 3.41 L
RVPLETH-PRED: 2.34 L
TLCPLETH-%PRED-PRE: 123 %
TLCPLETH-PRE: 8.66 L
TLCPLETH-PRED: 7.02 L
VA-%PRED-PRE: 116 %
VA-PRE: 7.52 L
VC-%PRED-PRE: 116 %
VC-PRE: 5.26 L
VC-PRED: 4.51 L

## 2025-07-02 PROCEDURE — 71275 CT ANGIOGRAPHY CHEST: CPT | Mod: GC | Performed by: RADIOLOGY

## 2025-07-02 PROCEDURE — 93321 DOPPLER ECHO F-UP/LMTD STD: CPT | Performed by: INTERNAL MEDICINE

## 2025-07-02 PROCEDURE — 93325 DOPPLER ECHO COLOR FLOW MAPG: CPT | Performed by: INTERNAL MEDICINE

## 2025-07-02 PROCEDURE — 93350 STRESS TTE ONLY: CPT | Performed by: INTERNAL MEDICINE

## 2025-07-02 PROCEDURE — 93016 CV STRESS TEST SUPVJ ONLY: CPT | Performed by: INTERNAL MEDICINE

## 2025-07-02 PROCEDURE — 93352 ADMIN ECG CONTRAST AGENT: CPT | Performed by: INTERNAL MEDICINE

## 2025-07-02 PROCEDURE — 93017 CV STRESS TEST TRACING ONLY: CPT | Performed by: INTERNAL MEDICINE

## 2025-07-02 PROCEDURE — 93018 CV STRESS TEST I&R ONLY: CPT | Performed by: INTERNAL MEDICINE

## 2025-07-02 RX ORDER — IOPAMIDOL 755 MG/ML
100 INJECTION, SOLUTION INTRAVASCULAR ONCE
Status: COMPLETED | OUTPATIENT
Start: 2025-07-02 | End: 2025-07-02

## 2025-07-02 RX ADMIN — IOPAMIDOL 100 ML: 755 INJECTION, SOLUTION INTRAVASCULAR at 09:31

## 2025-07-02 RX ADMIN — Medication 5 ML (DILUTED): at 08:56

## 2025-07-04 DIAGNOSIS — R39.9 LOWER URINARY TRACT SYMPTOMS (LUTS): ICD-10-CM

## 2025-07-07 RX ORDER — TAMSULOSIN HYDROCHLORIDE 0.4 MG/1
0.4 CAPSULE ORAL DAILY
Qty: 90 CAPSULE | Refills: 3 | OUTPATIENT
Start: 2025-07-07

## (undated) RX ORDER — NITROGLYCERIN 0.4 MG/1
TABLET SUBLINGUAL
Status: DISPENSED
Start: 2020-09-01

## (undated) RX ORDER — METOPROLOL TARTRATE 1 MG/ML
INJECTION, SOLUTION INTRAVENOUS
Status: DISPENSED
Start: 2020-09-01